# Patient Record
Sex: FEMALE | Race: WHITE | URBAN - METROPOLITAN AREA
[De-identification: names, ages, dates, MRNs, and addresses within clinical notes are randomized per-mention and may not be internally consistent; named-entity substitution may affect disease eponyms.]

---

## 2018-12-05 ENCOUNTER — HOSPITAL ENCOUNTER (OUTPATIENT)
Dept: ULTRASOUND IMAGING | Facility: CLINIC | Age: 78
Discharge: HOME | End: 2018-12-05
Attending: FAMILY MEDICINE | Admitting: FAMILY MEDICINE
Payer: MEDICARE

## 2018-12-05 DIAGNOSIS — M79.89 PAIN AND SWELLING OF LOWER EXTREMITY, UNSPECIFIED LATERALITY: ICD-10-CM

## 2018-12-05 DIAGNOSIS — M79.606 PAIN AND SWELLING OF LOWER EXTREMITY, UNSPECIFIED LATERALITY: ICD-10-CM

## 2018-12-05 DIAGNOSIS — R60.0 LEG EDEMA: ICD-10-CM

## 2018-12-05 PROCEDURE — 93970 EXTREMITY STUDY: CPT

## 2024-10-01 ENCOUNTER — TRANSCRIBE ORDERS (OUTPATIENT)
Dept: OTHER | Age: 84
End: 2024-10-01

## 2024-10-01 DIAGNOSIS — I27.21 PULMONARY ARTERIAL HYPERTENSION (H): Primary | ICD-10-CM

## 2024-10-02 ENCOUNTER — TELEPHONE (OUTPATIENT)
Dept: CARDIOLOGY | Facility: CLINIC | Age: 84
End: 2024-10-02
Payer: COMMERCIAL

## 2024-10-02 DIAGNOSIS — E61.1 IRON DEFICIENCY: ICD-10-CM

## 2024-10-02 DIAGNOSIS — R06.02 SOB (SHORTNESS OF BREATH): ICD-10-CM

## 2024-10-02 DIAGNOSIS — Z11.4 ENCOUNTER FOR SCREENING FOR HIV: ICD-10-CM

## 2024-10-02 DIAGNOSIS — Z11.59 NEED FOR HEPATITIS B SCREENING TEST: ICD-10-CM

## 2024-10-02 DIAGNOSIS — I27.20 PULMONARY HYPERTENSION (H): Primary | ICD-10-CM

## 2024-10-02 DIAGNOSIS — E78.5 DYSLIPIDEMIA: ICD-10-CM

## 2024-10-02 NOTE — TELEPHONE ENCOUNTER
[facility] Call Center    Phone Message    May a detailed message be left on voicemail: yes     Reason for Call: Appointment Intake    Referring Provider Name: Dr Dez Valdes     Diagnosis and/or Symptoms:     Pulmonary arterial hypertension (H) [I27.21]     Please call pt to schedule.      Action Taken: Message routed to:  [facility] ([facility] cardio    Travel Screening: Not Applicable     Date of Service:

## 2024-10-04 ENCOUNTER — PRE VISIT (OUTPATIENT)
Dept: CARDIOLOGY | Facility: CLINIC | Age: 84
End: 2024-10-04
Payer: COMMERCIAL

## 2024-10-04 DIAGNOSIS — Z11.59 ENCOUNTER FOR SCREENING FOR OTHER VIRAL DISEASES: ICD-10-CM

## 2024-10-04 DIAGNOSIS — D64.9 ANEMIA, UNSPECIFIED: ICD-10-CM

## 2024-10-04 DIAGNOSIS — I27.20 PULMONARY HYPERTENSION (H): Primary | ICD-10-CM

## 2024-10-04 DIAGNOSIS — R06.02 SOB (SHORTNESS OF BREATH): ICD-10-CM

## 2024-10-04 NOTE — TELEPHONE ENCOUNTER
RECORDS RECEIVED FROM:    DATE RECEIVED:    GENERAL RECORDS STATUS DETAILS   OFFICE NOTE from cardiologists Care Everywhere 6-20-24 Lucio   EKG (STRIPS & REPORTS) In process 9-27-24   ECHOS (IMAGES AND REPORTS) Received 8-21-24   PULMONARY HYPERTENSION      6 MINUTE WALK TEST Internal 10-4-24   PULMONARY FUNCTION TESTS In process 9-13-24   RIGHT HEART CATH (IMAGES) Received 9-27-24   SLEEP STUDY / OVERNIGHT OXIMETRY N/A    XR CHEST   (IMAGES AND REPORTS) Received 9-19-24   CHEST CT  (IMAGES AND REPORTS) N/A    V/Q SCAN (IMAGES) N/A    LIVER US  (IMAGES AND REPORTS) N/A    ANGIOGRAMS (IMAGES) Received CT Angio Chest 11-26-23   STRESS TEST   (IMAGES AND REPORTS) Received 4-12-24     [facility] Records/ Imaging Requested:   Action Taken Right Heart Cath Report  ECG Strips  PFT Full Report 9-27-24  All Of 2024 9-13-24     XR Chest Images  CT Angio Chest Images  NM Lexiscan Stress Test Images 9-19-24, 6-3-24  11-26-23  4-12-24     Right Heart Cath Images 9-27-24     ECHO Images 8-21-24, 6-5-24, 3-11-24

## 2024-12-12 ENCOUNTER — PRE VISIT (OUTPATIENT)
Dept: CARDIOLOGY | Facility: CLINIC | Age: 84
End: 2024-12-12
Payer: COMMERCIAL

## 2024-12-12 NOTE — TELEPHONE ENCOUNTER
Patient Name: Christina Novoa Age: 84 year old, female, 1940 MRN: [id number]   Referring Provider:  Dr Dez Valdes  [facility] Appt:         PH Medications:        Patient History: Pt has a history of chronic respiratory failure on intermittent home oxygen, chronic HFpEF, pulmonary hypertension, HTN, and peripheral neuropathy , emphysema    Multiple hospitalizations over the past year primarily for PEARSON, Hypoxia due to acute on chronic HFpEF     CARD: Dez Valdes-  6/20/24  PULM: Valencia Tellez-  9/13/24      Recent Testing:       Date Test Epic Media/Scan Care Everywhere     [facility]                 2. Resting:     3. Pressures: RA 10 mm Hg, PA 80/23 (44) mm Hg, PCWP 9 mm Hg.     4. Output (Candida, on room air): 2.9 L/min, 2.2 L/min/m2.     5. Resistance (Candida, on room air): SVR 2017 dsc-5,  dsc-5 (12.1 SNOWDEN).     6. Output (TD): 3.0 L/min, 2.3 L/min/m2.     7. Resistance (TD): SVR 1947,  dsc-5.     8. Post-drug therapy:     9. 60ppm nitric oxide, delivered via facemask with 10L/min O2.     10. Pressures: RA 5 mm Hg, PA 66/18 (37) mm Hg, WP 12 mm Hg.     11. Output (TD): 3.33 L/min, 2.5 L/min/m2.     12. Resistance (TD): SVR 2042 dsc-5,  dsc-5 (8 SNOWDEN).     13. Overall, with notable / severe pulmonary hypertension. Largely pre-capillary contribution to pulmonary hypertension, somewhat out of proportion to lung disease alone. There is a partial response to vasodilator challenge, with the mean PA   pressure decreasing by 7-8 mm Hg with a corresponding drop in PVR, and increase in cardiac output.  []  []   []      Angio/Stress      1. Normal hemodynamic response to pharmacological stress test with no ectopy noted.     2. A regadenoson infusion pharmacologic stress test was performed.     3. Functional capacity was not assessed.     4. The patient experienced shortness of breath during the stress test.     5. Negative stress ECG for ST segment depression.     6. Stress and rest perfusion  images are normal.  No evidence of infarct or ischemia seen.  LV is normal in size and LVEF is normal. EF >65%.    7. No prior study for comparison.     8. Myocardial perfusion imaging is normal.     Prior Study Comparison     No prior study for comparison.  []  []   []      Echo                1. Limited Echo w/ Doppler [[id number]].     2. Sinus tachycardia during study.     3. Normal LV size. Visually estimated LVEF >70%. No regional wall motion abnormalities. (Normal function). Grade 1 diastolic dysfunction.     4. Moderate RV enlargement with at least moderately reduced systolic function. Estimated PASP 67 mmHg + RA Pressure.     5. Mild aortic valve regurgitation.     6. There is mild tricuspid valve regurgitation.     7. The estimated pulmonary artery systolic pressure is 70 mmHg, moderate to severe pulmonary hypertension.     8. Main pulmonary artery dimension is at the dilated.     9. Mild-moderate pulmonic regurgitation.     10. The IVC measures <2.1 cm with >50% collapse upon inspiration consistent with normal right atrial pressure, 3 mmHg.     11. Compared to the prior study from 6/2024, the current study reveals higher pulmonary pressures, less AI and TR                        []  []   []      6MWT         02 sats on RA at rest 89 with HR 80; 02 sats on RA with activity 85 and HR 95. Pt walked for 1.5 minutes and 182* ft. Placed pt on 3 lpm continuous flow 02.   02 sats on 3 lpm 02 at rest 95 with HR 82; 02 sats on 3 lpm 02 with activity 91 and HR 84. Pt walked for 5 minutes and 546* ft.  []   []   []      PFT                 9/13/24   FVC 1.69 L, 87%  FEV1 1.24 L, 84%  FEV1/FVC 74%  TLC 2.47 L, 72%  RV 0.78 L, 50%  RV/TLC 32%  DLCO 16%  []   []   []      Sleep Study   []  []   []      Chest CT      IMPRESSION:   1. No evidence of pulmonary embolism. The main pulmonary artery is enlarged measuring 3.6 cm in diameter, this can be seen with pulmonary hypertension.   2.  Cardiomegaly, and reflux of  contrast into the IVC and hepatic veins, suggesting right heart dysfunction.   3.  Upper and basilar predominant emphysematous changes    []  []   []      V/Q Scan []   []   []      Abd/Liver US []   []   []      Misc:  []   []   []         []   []   []

## 2024-12-17 ENCOUNTER — LAB (OUTPATIENT)
Dept: CARDIOLOGY | Facility: CLINIC | Age: 84
End: 2024-12-17
Payer: COMMERCIAL

## 2024-12-17 ENCOUNTER — OFFICE VISIT (OUTPATIENT)
Dept: CARDIOLOGY | Facility: CLINIC | Age: 84
End: 2024-12-17
Payer: COMMERCIAL

## 2024-12-17 VITALS
BODY MASS INDEX: 20.6 KG/M2 | SYSTOLIC BLOOD PRESSURE: 125 MMHG | WEIGHT: 102 LBS | DIASTOLIC BLOOD PRESSURE: 73 MMHG | HEART RATE: 106 BPM

## 2024-12-17 DIAGNOSIS — D64.9 ANEMIA, UNSPECIFIED: ICD-10-CM

## 2024-12-17 DIAGNOSIS — R06.02 SOB (SHORTNESS OF BREATH): ICD-10-CM

## 2024-12-17 DIAGNOSIS — Z11.59 ENCOUNTER FOR SCREENING FOR OTHER VIRAL DISEASES: ICD-10-CM

## 2024-12-17 DIAGNOSIS — I27.20 PULMONARY HYPERTENSION (H): ICD-10-CM

## 2024-12-17 DIAGNOSIS — I27.21 PULMONARY ARTERIAL HYPERTENSION (H): ICD-10-CM

## 2024-12-17 LAB
ALBUMIN SERPL BCG-MCNC: 4.5 G/DL (ref 3.5–5.2)
ALP SERPL-CCNC: 99 U/L (ref 40–150)
ALT SERPL W P-5'-P-CCNC: 7 U/L (ref 0–50)
ANION GAP SERPL CALCULATED.3IONS-SCNC: 16 MMOL/L (ref 7–15)
AST SERPL W P-5'-P-CCNC: 22 U/L (ref 0–45)
BILIRUB DIRECT SERPL-MCNC: 0.25 MG/DL (ref 0–0.3)
BILIRUB SERPL-MCNC: 0.7 MG/DL
BUN SERPL-MCNC: 15.2 MG/DL (ref 8–23)
CALCIUM SERPL-MCNC: 9.5 MG/DL (ref 8.8–10.4)
CHLORIDE SERPL-SCNC: 100 MMOL/L (ref 98–107)
CREAT SERPL-MCNC: 1.19 MG/DL (ref 0.51–0.95)
CRP SERPL-MCNC: 8.09 MG/L
EGFRCR SERPLBLD CKD-EPI 2021: 45 ML/MIN/1.73M2
ERYTHROCYTE [DISTWIDTH] IN BLOOD BY AUTOMATED COUNT: 14.6 % (ref 10–15)
GLUCOSE SERPL-MCNC: 120 MG/DL (ref 70–99)
HCO3 SERPL-SCNC: 26 MMOL/L (ref 22–29)
HCT VFR BLD AUTO: 48.7 % (ref 35–47)
HGB BLD-MCNC: 15.9 G/DL (ref 11.7–15.7)
INR PPP: 1.17 (ref 0.85–1.15)
IRON BINDING CAPACITY (ROCHE): 295 UG/DL (ref 240–430)
IRON SATN MFR SERPL: 21 % (ref 15–46)
IRON SERPL-MCNC: 63 UG/DL (ref 37–145)
MCH RBC QN AUTO: 32.4 PG (ref 26.5–33)
MCHC RBC AUTO-ENTMCNC: 32.6 G/DL (ref 31.5–36.5)
MCV RBC AUTO: 99 FL (ref 78–100)
NT-PROBNP SERPL-MCNC: 8368 PG/ML (ref 0–1800)
PLATELET # BLD AUTO: 228 10E3/UL (ref 150–450)
POTASSIUM SERPL-SCNC: 4.1 MMOL/L (ref 3.4–5.3)
PROT SERPL-MCNC: 8.7 G/DL (ref 6.4–8.3)
RBC # BLD AUTO: 4.9 10E6/UL (ref 3.8–5.2)
SODIUM SERPL-SCNC: 142 MMOL/L (ref 135–145)
TSH SERPL DL<=0.005 MIU/L-ACNC: 1.75 UIU/ML (ref 0.3–4.2)
WBC # BLD AUTO: 8 10E3/UL (ref 4–11)

## 2024-12-17 PROCEDURE — 85613 RUSSELL VIPER VENOM DILUTED: CPT

## 2024-12-17 PROCEDURE — 86803 HEPATITIS C AB TEST: CPT

## 2024-12-17 PROCEDURE — 85390 FIBRINOLYSINS SCREEN I&R: CPT | Performed by: PATHOLOGY

## 2024-12-17 PROCEDURE — 99417 PROLNG OP E/M EACH 15 MIN: CPT | Performed by: INTERNAL MEDICINE

## 2024-12-17 PROCEDURE — 85027 COMPLETE CBC AUTOMATED: CPT

## 2024-12-17 PROCEDURE — 99205 OFFICE O/P NEW HI 60 MIN: CPT | Performed by: INTERNAL MEDICINE

## 2024-12-17 PROCEDURE — 80053 COMPREHEN METABOLIC PANEL: CPT

## 2024-12-17 PROCEDURE — 80061 LIPID PANEL: CPT

## 2024-12-17 PROCEDURE — 86140 C-REACTIVE PROTEIN: CPT

## 2024-12-17 PROCEDURE — 86431 RHEUMATOID FACTOR QUANT: CPT

## 2024-12-17 PROCEDURE — 85610 PROTHROMBIN TIME: CPT

## 2024-12-17 PROCEDURE — 84443 ASSAY THYROID STIM HORMONE: CPT

## 2024-12-17 PROCEDURE — 99000 SPECIMEN HANDLING OFFICE-LAB: CPT

## 2024-12-17 PROCEDURE — 86039 ANTINUCLEAR ANTIBODIES (ANA): CPT

## 2024-12-17 PROCEDURE — 87340 HEPATITIS B SURFACE AG IA: CPT

## 2024-12-17 PROCEDURE — 83880 ASSAY OF NATRIURETIC PEPTIDE: CPT

## 2024-12-17 PROCEDURE — 82248 BILIRUBIN DIRECT: CPT

## 2024-12-17 PROCEDURE — 84238 ASSAY NONENDOCRINE RECEPTOR: CPT | Mod: 90

## 2024-12-17 PROCEDURE — 86038 ANTINUCLEAR ANTIBODIES: CPT

## 2024-12-17 PROCEDURE — 86706 HEP B SURFACE ANTIBODY: CPT

## 2024-12-17 PROCEDURE — 86704 HEP B CORE ANTIBODY TOTAL: CPT

## 2024-12-17 PROCEDURE — 83550 IRON BINDING TEST: CPT

## 2024-12-17 PROCEDURE — 87389 HIV-1 AG W/HIV-1&-2 AB AG IA: CPT

## 2024-12-17 PROCEDURE — 83529 ASAY OF INTERLEUKIN-6 (IL-6): CPT

## 2024-12-17 PROCEDURE — 85730 THROMBOPLASTIN TIME PARTIAL: CPT

## 2024-12-17 PROCEDURE — 83540 ASSAY OF IRON: CPT

## 2024-12-17 RX ORDER — GABAPENTIN 100 MG/1
100 CAPSULE ORAL AT BEDTIME
COMMUNITY

## 2024-12-17 RX ORDER — POTASSIUM CHLORIDE 1500 MG/1
1 TABLET, EXTENDED RELEASE ORAL
COMMUNITY
Start: 2024-11-28

## 2024-12-17 RX ORDER — AMLODIPINE BESYLATE 2.5 MG/1
2.5 TABLET ORAL DAILY
COMMUNITY
Start: 2024-10-30 | End: 2025-10-30

## 2024-12-17 RX ORDER — SILDENAFIL CITRATE 20 MG/1
20 TABLET ORAL 3 TIMES DAILY
COMMUNITY
Start: 2024-12-17 | End: 2024-12-18

## 2024-12-17 RX ORDER — ASCORBIC ACID 250 MG
TABLET ORAL
COMMUNITY

## 2024-12-17 RX ORDER — TRAZODONE HYDROCHLORIDE 50 MG/1
TABLET, FILM COATED ORAL
COMMUNITY
Start: 2024-07-25

## 2024-12-17 RX ORDER — CALCIUM CARBONATE 500 MG/1
500 TABLET, CHEWABLE ORAL
COMMUNITY

## 2024-12-17 RX ORDER — FUROSEMIDE 20 MG/1
1 TABLET ORAL
COMMUNITY
Start: 2024-04-16 | End: 2024-12-17

## 2024-12-17 RX ORDER — MAGNESIUM OXIDE 400 MG/1
1 TABLET ORAL DAILY
COMMUNITY

## 2024-12-17 RX ORDER — TORSEMIDE 20 MG/1
40 TABLET ORAL DAILY
COMMUNITY

## 2024-12-17 NOTE — LETTER
2024    Suri Singh MD  [facility] [address]  [facility] [address]    RE: Christina Novoa       Dear Colleague,     I had the pleasure of seeing Christina Novoa in the [facility].  Service Date: 2024    RE:  Christina Novoa   MRN:  [id number]  :  1940      Dear Dr. Valdes:    We had the pleasure of seeing Christina Novoa at the [facility]. Although you are familiar with this patient's history, please allow me to summarize it for the purpose of our records.     Ms. Novoa is a very delightful 84-year-old female who was apparently healthy up until last  when she started noticing exertional shortness of breath and lower extremity edema.  She went to the emergency room.  CT pulmonary angiogram showed no pulmonary embolism but dilated main pulmonary artery and bilateral upper lobe emphysema.  She tested positive for COVID.  Subsequently she continued to have significant exertional shortness of breath lower extremity edema and fatigue.    In light of this, she was seen by both cardiology and pulmonary at [facility].  Her echocardiogram revealed moderately dilated right ventricle with moderately reduced right ventricular systolic function.  Her estimated right ventricle systolic pressure was elevated at 65 mmHg.  This was new when compared to her prior echocardiogram from 2018.  Subsequently she had a myocardial perfusion imaging which showed no evidence of reversible ischemia.  She had an invasive right heart catheterization that confirmed severe precapillary pulm hypertension.    Her RA pressure was 10 mmHg, RV was 79 over 15 mmHg, PA was 80/23 with a mean of 44 mmHg, pulmonary capillary wedge pressure of 9, aortic saturation 96%, PA saturation of 58.8, thermodilution cardiac output of 3.03 L/min, index of 2.3 L/min/m , and PVR of 11.5 Wood units.  She had acute vasodilator testing with  inhaled nitric oxide.  With inhaled nitric oxide her PA pressure dropped to 66/18 with a mean of 37, PA saturation was 79%, aortic saturation was 100%, thermodilution cardiac output of 3.3 with an index of 2.5, pulmonary capital wedge pressure of 12 mmHg and 7.7 Wood units.    She had a VQ scan that was low probability for pulmonary embolism.  Her pulmonary function test showed an FVC of 87% predicted, FEV1 of 84% predicted, FEV1 by FVC of 74% predicted, TLC of 72% predicted, and DLCO of 16% predicted.    She did not respond to furosemide.  She had a significant improvement in her lower extremity edema when she was switched to torsemide.  She is now referred to us for further management of her pulm hypertension.    At present, she is feeling much better than this past summer.  She has shortness of breath only with exertion.  She has no symptoms at rest or with her usual activities.  She can walk up to a block without needing to stop.  She gets shortness of breath if she walks more than a block or when she fixes her bed.  She can do her laundry and grocery shopping by herself.  I would currently characterize her as NYHA functional class IIb.  Her lower extremity swelling has improved on low-dose torsemide.  She denies having any exertional chest pain or chest pressure.  She has no exertional presyncope or syncope.  No recent hospitalization or ER visits.  No palpitations.    She was prescribed 3 L of supplemental oxygen.  She uses it only as needed.  She is not using it on a regular basis.    PAST MEDICAL HISTORY:  1.  Systemic hypertension  2.  Hyperlipidemia  3.  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  Recent diagnosis of pulmonary hypertension and right heart failure    PAST SURGICAL HISTORY:  Cataract surgery    CURRENT MEDICATIONS:  Current Outpatient Medications   Medication Sig Dispense Refill     amLODIPine (NORVASC) 2.5 MG tablet Take 2.5 mg by mouth daily.       ascorbic acid 62.5 mg  TABS quarter-tab        calcium carbonate (TUMS) 500 MG chewable tablet Take 500 mg by mouth.       gabapentin (NEURONTIN) 100 MG capsule Take 100 mg by mouth at bedtime.       magnesium oxide (MAG-OX) 400 MG tablet Take 1 tablet by mouth daily.       multivitamin, therapeutic (THERA-VIT) TABS Take 1 tablet by mouth daily       OMEPRAZOLE PO Take 20 mg by mouth daily (before lunch).       potassium chloride ER (K-TAB) 20 MEQ CR tablet Take 1 tablet by mouth daily at 2 pm.       sildenafil (REVATIO) 20 MG tablet Take 1 tablet (20 mg) by mouth 3 times daily.       study - aspirin, IDS# [id number], 81 mg tablet Take 1 tablet by mouth daily.       torsemide (DEMADEX) 20 MG tablet Take 20 mg by mouth daily.       traZODone (DESYREL) 50 MG tablet        TRAZODONE HCL PO Take 100 mg by mouth At Bedtime.       No current facility-administered medications for this visit.       ROS:   10 point ROS negative except as discussed in above HPI.    SOCIAL HISTORY:  She is a .  Her   approximately 4 years ago.  She lives by herself independently in [facility].  She has 4 grown kids.  She worked as a  in [employer] for many years.  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  She does not smoke.  Her  smoked very briefly before their first child was born.  She does not drink alcohol.    FAMILY HISTORY:  No family history of pulmonary hypertension, chronic lung disease, or heart disease.    EXAM:  /73 (BP Location: Left arm, Patient Position: Sitting, Cuff Size: Adult Small)   Pulse 106   Wt 46.3 kg (102 lb)   BMI 20.60 kg/m    Awake, alert, and oriented x 3.   Comfortable. No apparent distress.  No pallor, cyanosis, or clubbing  Carotids 2+ bilateral and pulse was regular in rhythm.  Jugular venous distention visible at 6 cm above manubrium sternal angle.  Heart: regular, normal S1/loud P2, no murmur, gallop, rub  Lungs: NVBS and clear to auscultation  bilaterally, basal crepitations bilaterally   abdomen: soft, non-tender, bowel sounds present, no hepatosplenomegaly  Extremities: 1+ edema bilaterally.  Extremities were warm and well-perfused.  Neurological: No gross focal neurological deficit    Labs:  Recent Results (from the past week)   Iron and iron binding capacity    Collection Time: 12/17/24  1:07 PM   Result Value Ref Range    Iron 63 37 - 145 ug/dL    Iron Binding Capacity 295 240 - 430 ug/dL    Iron Sat Index 21 15 - 46 %   CRP inflammation    Collection Time: 12/17/24  1:07 PM   Result Value Ref Range    CRP Inflammation 8.09 (H) <5.00 mg/L   TSH    Collection Time: 12/17/24  1:07 PM   Result Value Ref Range    TSH 1.75 0.30 - 4.20 uIU/mL   N terminal pro BNP outpatient    Collection Time: 12/17/24  1:07 PM   Result Value Ref Range    N Terminal Pro BNP Outpatient 8,368 (H) 0 - 1,800 pg/mL   INR    Collection Time: 12/17/24  1:07 PM   Result Value Ref Range    INR 1.17 (H) 0.85 - 1.15   CBC with platelets    Collection Time: 12/17/24  1:07 PM   Result Value Ref Range    WBC Count 8.0 4.0 - 11.0 10e3/uL    RBC Count 4.90 3.80 - 5.20 10e6/uL    Hemoglobin 15.9 (H) 11.7 - 15.7 g/dL    Hematocrit 48.7 (H) 35.0 - 47.0 %    MCV 99 78 - 100 fL    MCH 32.4 26.5 - 33.0 pg    MCHC 32.6 31.5 - 36.5 g/dL    RDW 14.6 10.0 - 15.0 %    Platelet Count 228 150 - 450 10e3/uL   Hepatic panel    Collection Time: 12/17/24  1:07 PM   Result Value Ref Range    Protein Total 8.7 (H) 6.4 - 8.3 g/dL    Albumin 4.5 3.5 - 5.2 g/dL    Bilirubin Total 0.7 <=1.2 mg/dL    Alkaline Phosphatase 99 40 - 150 U/L    AST 22 0 - 45 U/L    ALT 7 0 - 50 U/L    Bilirubin Direct 0.25 0.00 - 0.30 mg/dL   Basic metabolic panel    Collection Time: 12/17/24  1:07 PM   Result Value Ref Range    Sodium 142 135 - 145 mmol/L    Potassium 4.1 3.4 - 5.3 mmol/L    Chloride 100 98 - 107 mmol/L    Carbon Dioxide (CO2) 26 22 - 29 mmol/L    Anion Gap 16 (H) 7 - 15 mmol/L    Urea Nitrogen 15.2 8.0 - 23.0 mg/dL     Creatinine 1.19 (H) 0.51 - 0.95 mg/dL    GFR Estimate 45 (L) >60 mL/min/1.73m2    Calcium 9.5 8.8 - 10.4 mg/dL    Glucose 120 (H) 70 - 99 mg/dL     EKG (September 2024):  Sinus rhythm, right axis deviation, T wave inversions in anterolateral and inferior leads    Echocardiogram (September 2024):  1. Sinus rhythm during study.     2. Normal LV size with normal wall thickness. Visually estimated LVEF 65-70%. LV appears underfilled. No regional wall motion abnormalities. (Normal function). Normal diastolic function.     3. RV is enlarged in size with moderately decreased systolic function.     4. D shaped septum suggestive of RV pressure/volume overload.     5. Normal LA size.     6. Severe RA enlargement.     7. Moderate aortic valve regurgitation.     8. There is moderate-severe tricuspid valve regurgitation.     9. The estimated pulmonary artery systolic pressure is 56 mmHg.     10. Trileaflet aortic valve.     11. Compared to the prior study from 2024, the current study reveals worsened TR based on colorflow Doppler, RV enlargement, underfilled LV. Consider cardiology consult and right heart catherization.     RHC (September 2024):  Her RA pressure was 10 mmHg, RV was 79 over 15 mmHg, PA was 80/23 with a mean of 44 mmHg, pulmonary capillary wedge pressure of 9, aortic saturation 96%, PA saturation of 58.8, thermodilution cardiac output of 3.03 L/min, index of 2.3 L/min/m , and PVR of 11.5 Wood units.  She had acute vasodilator testing with inhaled nitric oxide.  With inhaled nitric oxide her PA pressure dropped to 66/18 with a mean of 37, PA saturation was 79%, aortic saturation was 100%, thermodilution cardiac output of 3.3 with an index of 2.5, pulmonary capital wedge pressure of 12 mmHg and 7.7 Wood units.      PFT (September 2024)  Her pulmonary function test showed an FVC of 87% predicted, FEV1 of 84% predicted, FEV1 by FVC of 74% predicted, TLC of 72% predicted, and DLCO of 16% predicted.    V/Q scan  (October 2024)  Low probability for chronic thromboembolic disease    CT Chest (December 2023)  1.  No evidence of pulmonary embolism. The main pulmonary artery is enlarged measuring 3.6 cm in diameter, this can be seen with pulmonary hypertension.   2.  Cardiomegaly, and reflux of contrast into the IVC and hepatic veins, suggesting right heart dysfunction.   3.  Upper and basilar predominant emphysematous changes.       Assessment and Plan:     In summary, Christina Novoa is a extremely delightful 84-year-old female with a recent diagnosis of cystic lung disease, severe precapillary pulmonary hypertension and right heart failure who is referred to us for further evaluation management.    1.  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  Pulmonary arterial hypertension out of proportion to chronic lung disease  3.  Right heart failure with moderate RV dilatation and dysfunction  4.  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  Her pulmonary capillary wedge pressure was within normal limits.  She has no echocardiographic evidence of left heart disease.  Her VQ scan was low probability.  Her CT chest shows bilateral cystic lung disease.  Her PFT shows significant reduction in DLCO.  Her lung volumes were not that abnormal.    She is also in decompensated right heart failure.  Clinically she has mild elevation in JVD with lower extremity edema.  Her NT proBNP is significantly elevated.    To be thorough, I have recommended her to complete the serological workup for pulmonary hypertension to rule out other associated causes.  We will also do a 6-minute walk test to assess her exercise capacity and her oxygen requirements.    I discussed the diagnosis, the treatment options, and overall outcome.  The safety and efficacy of pulmonary  vasodilator therapy in patients with pulmonary arterial hypertension out of proportion to chronic lung disease is unclear.  Observational and retrospective data suggest that PDE 5 inhibitors improves right ventricular function, hemodynamics and may be survival.  There is a theoretical risk of worsening VQ mismatch and hypoxemia.      I had a very clear discussion with the lack of clear efficacy and safety data in this particular situation with her and her son.  They understand the risk and benefits and would like to try sildenafil therapy which I think is reasonable.  However,  first, I have recommended her to have a 6-minute walk test with O2 titration to determine how much oxygen she needs.  Will do this prior to starting sildenafil therapy.  Will repeat her 6-minute walk test in 4 to 6 weeks after she starts sildenafil to make sure that she is not having worsening VQ mismatch and hypoxemia.  I suspect that she likely will need supplemental oxygen with exertion which is much more important than sildenafil.  She is not a candidate for lung transplantation given her age.    In the interim, I have recommended her to increase her torsemide to 40 mg daily.  We also discussed the importance of low-salt diet. If her blood pressure is low on sildenafil we will consider stopping her amlodipine.  She will return to see us in 4 to 6 weeks after starting sildenafil therapy with a repeat 6-minute walk test.  She will call us in the interim should any further worsening symptoms    It was a pleasure seeing Eusebia Bright at the [facility]. Please contact us with any questions or concerns that you may have. We thank you for involving us in this patients care.    Total time today was 80 minutes reviewing notes, imaging, labs, patient visit, orders and documentation     Sincerely,      Christofer Beaulieu MD  Professor of Medicine  [facility]  Heart Failure,  Transplant, and Mechanical Circulatory Support Cardiology   Cardiovascular Division  [facility]   [phone]        Thank you for allowing me to participate in the care of your patient.      Sincerely,     Christofer Beaulieu MD     [facility]  cc:   Christofer Beaulieu MD  [address] [place] [id number]

## 2024-12-17 NOTE — LETTER
2024      RE: Christina Novoa  [address] [facility] [address]       Service Date: 2024    RE:  Christina Novoa   MRN:  [id number]  :  1940      Dear Dr. Valdes:    We had the pleasure of seeing Christina Novoa at the [facility]. Although you are familiar with this patient's history, please allow me to summarize it for the purpose of our records.     Ms. Novoa is a very delightful 84-year-old female who was apparently healthy up until last  when she started noticing exertional shortness of breath and lower extremity edema.  She went to the emergency room.  CT pulmonary angiogram showed no pulmonary embolism but dilated main pulmonary artery and bilateral upper lobe emphysema.  She tested positive for COVID.  Subsequently she continued to have significant exertional shortness of breath lower extremity edema and fatigue.    In light of this, she was seen by both cardiology and pulmonary at [facility].  Her echocardiogram revealed moderately dilated right ventricle with moderately reduced right ventricular systolic function.  Her estimated right ventricle systolic pressure was elevated at 65 mmHg.  This was new when compared to her prior echocardiogram from 2018.  Subsequently she had a myocardial perfusion imaging which showed no evidence of reversible ischemia.  She had an invasive right heart catheterization that confirmed severe precapillary pulm hypertension.    Her RA pressure was 10 mmHg, RV was 79 over 15 mmHg, PA was 80/23 with a mean of 44 mmHg, pulmonary capillary wedge pressure of 9, aortic saturation 96%, PA saturation of 58.8, thermodilution cardiac output of 3.03 L/min, index of 2.3 L/min/m , and PVR of 11.5 Wood units.  She had acute vasodilator testing with inhaled nitric oxide.  With inhaled nitric oxide her PA pressure dropped to 66/18 with a mean of 37, PA saturation was 79%, aortic saturation was 100%,  thermodilution cardiac output of 3.3 with an index of 2.5, pulmonary capital wedge pressure of 12 mmHg and 7.7 Wood units.    She had a VQ scan that was low probability for pulmonary embolism.  Her pulmonary function test showed an FVC of 87% predicted, FEV1 of 84% predicted, FEV1 by FVC of 74% predicted, TLC of 72% predicted, and DLCO of 16% predicted.    She did not respond to furosemide.  She had a significant improvement in her lower extremity edema when she was switched to torsemide.  She is now referred to us for further management of her pulm hypertension.    At present, she is feeling much better than this past summer.  She has shortness of breath only with exertion.  She has no symptoms at rest or with her usual activities.  She can walk up to a block without needing to stop.  She gets shortness of breath if she walks more than a block or when she fixes her bed.  She can do her laundry and grocery shopping by herself.  I would currently characterize her as NYHA functional class IIb.  Her lower extremity swelling has improved on low-dose torsemide.  She denies having any exertional chest pain or chest pressure.  She has no exertional presyncope or syncope.  No recent hospitalization or ER visits.  No palpitations.    She was prescribed 3 L of supplemental oxygen.  She uses it only as needed.  She is not using it on a regular basis.    PAST MEDICAL HISTORY:  1.  Systemic hypertension  2.  Hyperlipidemia  3.  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  Recent diagnosis of pulmonary hypertension and right heart failure    PAST SURGICAL HISTORY:  Cataract surgery    CURRENT MEDICATIONS:  Current Outpatient Medications   Medication Sig Dispense Refill     amLODIPine (NORVASC) 2.5 MG tablet Take 2.5 mg by mouth daily.       ascorbic acid 62.5 mg TABS quarter-tab        calcium carbonate (TUMS) 500 MG chewable tablet Take 500 mg by mouth.       gabapentin (NEURONTIN) 100 MG capsule Take 100 mg by  mouth at bedtime.       magnesium oxide (MAG-OX) 400 MG tablet Take 1 tablet by mouth daily.       multivitamin, therapeutic (THERA-VIT) TABS Take 1 tablet by mouth daily       OMEPRAZOLE PO Take 20 mg by mouth daily (before lunch).       potassium chloride ER (K-TAB) 20 MEQ CR tablet Take 1 tablet by mouth daily at 2 pm.       sildenafil (REVATIO) 20 MG tablet Take 1 tablet (20 mg) by mouth 3 times daily.       study - aspirin, IDS# [id number], 81 mg tablet Take 1 tablet by mouth daily.       torsemide (DEMADEX) 20 MG tablet Take 20 mg by mouth daily.       traZODone (DESYREL) 50 MG tablet        TRAZODONE HCL PO Take 100 mg by mouth At Bedtime.       No current facility-administered medications for this visit.       ROS:   10 point ROS negative except as discussed in above HPI.    SOCIAL HISTORY:  She is a .  Her   approximately 4 years ago.  She lives by herself independently in [facility].  She has 4 grown kids.  She worked as a  in [employer] for many years.  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  She does not smoke.  Her  smoked very briefly before their first child was born.  She does not drink alcohol.    FAMILY HISTORY:  No family history of pulmonary hypertension, chronic lung disease, or heart disease.    EXAM:  /73 (BP Location: Left arm, Patient Position: Sitting, Cuff Size: Adult Small)   Pulse 106   Wt 46.3 kg (102 lb)   BMI 20.60 kg/m    Awake, alert, and oriented x 3.   Comfortable. No apparent distress.  No pallor, cyanosis, or clubbing  Carotids 2+ bilateral and pulse was regular in rhythm.  Jugular venous distention visible at 6 cm above manubrium sternal angle.  Heart: regular, normal S1/loud P2, no murmur, gallop, rub  Lungs: NVBS and clear to auscultation bilaterally, basal crepitations bilaterally   abdomen: soft, non-tender, bowel sounds present, no hepatosplenomegaly  Extremities: 1+ edema bilaterally.   Extremities were warm and well-perfused.  Neurological: No gross focal neurological deficit    Labs:  Recent Results (from the past week)   Iron and iron binding capacity    Collection Time: 12/17/24  1:07 PM   Result Value Ref Range    Iron 63 37 - 145 ug/dL    Iron Binding Capacity 295 240 - 430 ug/dL    Iron Sat Index 21 15 - 46 %   CRP inflammation    Collection Time: 12/17/24  1:07 PM   Result Value Ref Range    CRP Inflammation 8.09 (H) <5.00 mg/L   TSH    Collection Time: 12/17/24  1:07 PM   Result Value Ref Range    TSH 1.75 0.30 - 4.20 uIU/mL   N terminal pro BNP outpatient    Collection Time: 12/17/24  1:07 PM   Result Value Ref Range    N Terminal Pro BNP Outpatient 8,368 (H) 0 - 1,800 pg/mL   INR    Collection Time: 12/17/24  1:07 PM   Result Value Ref Range    INR 1.17 (H) 0.85 - 1.15   CBC with platelets    Collection Time: 12/17/24  1:07 PM   Result Value Ref Range    WBC Count 8.0 4.0 - 11.0 10e3/uL    RBC Count 4.90 3.80 - 5.20 10e6/uL    Hemoglobin 15.9 (H) 11.7 - 15.7 g/dL    Hematocrit 48.7 (H) 35.0 - 47.0 %    MCV 99 78 - 100 fL    MCH 32.4 26.5 - 33.0 pg    MCHC 32.6 31.5 - 36.5 g/dL    RDW 14.6 10.0 - 15.0 %    Platelet Count 228 150 - 450 10e3/uL   Hepatic panel    Collection Time: 12/17/24  1:07 PM   Result Value Ref Range    Protein Total 8.7 (H) 6.4 - 8.3 g/dL    Albumin 4.5 3.5 - 5.2 g/dL    Bilirubin Total 0.7 <=1.2 mg/dL    Alkaline Phosphatase 99 40 - 150 U/L    AST 22 0 - 45 U/L    ALT 7 0 - 50 U/L    Bilirubin Direct 0.25 0.00 - 0.30 mg/dL   Basic metabolic panel    Collection Time: 12/17/24  1:07 PM   Result Value Ref Range    Sodium 142 135 - 145 mmol/L    Potassium 4.1 3.4 - 5.3 mmol/L    Chloride 100 98 - 107 mmol/L    Carbon Dioxide (CO2) 26 22 - 29 mmol/L    Anion Gap 16 (H) 7 - 15 mmol/L    Urea Nitrogen 15.2 8.0 - 23.0 mg/dL    Creatinine 1.19 (H) 0.51 - 0.95 mg/dL    GFR Estimate 45 (L) >60 mL/min/1.73m2    Calcium 9.5 8.8 - 10.4 mg/dL    Glucose 120 (H) 70 - 99 mg/dL      EKG (September 2024):  Sinus rhythm, right axis deviation, T wave inversions in anterolateral and inferior leads    Echocardiogram (September 2024):  1. Sinus rhythm during study.     2. Normal LV size with normal wall thickness. Visually estimated LVEF 65-70%. LV appears underfilled. No regional wall motion abnormalities. (Normal function). Normal diastolic function.     3. RV is enlarged in size with moderately decreased systolic function.     4. D shaped septum suggestive of RV pressure/volume overload.     5. Normal LA size.     6. Severe RA enlargement.     7. Moderate aortic valve regurgitation.     8. There is moderate-severe tricuspid valve regurgitation.     9. The estimated pulmonary artery systolic pressure is 56 mmHg.     10. Trileaflet aortic valve.     11. Compared to the prior study from 2024, the current study reveals worsened TR based on colorflow Doppler, RV enlargement, underfilled LV. Consider cardiology consult and right heart catherization.     RHC (September 2024):  Her RA pressure was 10 mmHg, RV was 79 over 15 mmHg, PA was 80/23 with a mean of 44 mmHg, pulmonary capillary wedge pressure of 9, aortic saturation 96%, PA saturation of 58.8, thermodilution cardiac output of 3.03 L/min, index of 2.3 L/min/m , and PVR of 11.5 Wood units.  She had acute vasodilator testing with inhaled nitric oxide.  With inhaled nitric oxide her PA pressure dropped to 66/18 with a mean of 37, PA saturation was 79%, aortic saturation was 100%, thermodilution cardiac output of 3.3 with an index of 2.5, pulmonary capital wedge pressure of 12 mmHg and 7.7 Wood units.      PFT (September 2024)  Her pulmonary function test showed an FVC of 87% predicted, FEV1 of 84% predicted, FEV1 by FVC of 74% predicted, TLC of 72% predicted, and DLCO of 16% predicted.    V/Q scan (October 2024)  Low probability for chronic thromboembolic disease    CT Chest (December 2023)  1.  No evidence of pulmonary embolism. The main  pulmonary artery is enlarged measuring 3.6 cm in diameter, this can be seen with pulmonary hypertension.   2.  Cardiomegaly, and reflux of contrast into the IVC and hepatic veins, suggesting right heart dysfunction.   3.  Upper and basilar predominant emphysematous changes.       Assessment and Plan:     In summary, Christina Novoa is a extremely delightful 84-year-old female with a recent diagnosis of cystic lung disease, severe precapillary pulmonary hypertension and right heart failure who is referred to us for further evaluation management.    1.  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  Pulmonary arterial hypertension out of proportion to chronic lung disease  3.  Right heart failure with moderate RV dilatation and dysfunction  4.  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  Her pulmonary capillary wedge pressure was within normal limits.  She has no echocardiographic evidence of left heart disease.  Her VQ scan was low probability.  Her CT chest shows bilateral cystic lung disease.  Her PFT shows significant reduction in DLCO.  Her lung volumes were not that abnormal.    She is also in decompensated right heart failure.  Clinically she has mild elevation in JVD with lower extremity edema.  Her NT proBNP is significantly elevated.    To be thorough, I have recommended her to complete the serological workup for pulmonary hypertension to rule out other associated causes.  We will also do a 6-minute walk test to assess her exercise capacity and her oxygen requirements.    I discussed the diagnosis, the treatment options, and overall outcome.  The safety and efficacy of pulmonary vasodilator therapy in patients with pulmonary arterial hypertension out of proportion to chronic lung disease is unclear.  Observational and  retrospective data suggest that PDE 5 inhibitors improves right ventricular function, hemodynamics and may be survival.  There is a theoretical risk of worsening VQ mismatch and hypoxemia.      I had a very clear discussion with the lack of clear efficacy and safety data in this particular situation with her and her son.  They understand the risk and benefits and would like to try sildenafil therapy which I think is reasonable.  However,  first, I have recommended her to have a 6-minute walk test with O2 titration to determine how much oxygen she needs.  Will do this prior to starting sildenafil therapy.  Will repeat her 6-minute walk test in 4 to 6 weeks after she starts sildenafil to make sure that she is not having worsening VQ mismatch and hypoxemia.  I suspect that she likely will need supplemental oxygen with exertion which is much more important than sildenafil.  She is not a candidate for lung transplantation given her age.    In the interim, I have recommended her to increase her torsemide to 40 mg daily.  We also discussed the importance of low-salt diet. If her blood pressure is low on sildenafil we will consider stopping her amlodipine.  She will return to see us in 4 to 6 weeks after starting sildenafil therapy with a repeat 6-minute walk test.  She will call us in the interim should any further worsening symptoms    It was a pleasure seeing Christina Novoa at the [facility]. Please contact us with any questions or concerns that you may have. We thank you for involving us in this patients care.    Total time today was 80 minutes reviewing notes, imaging, labs, patient visit, orders and documentation     Sincerely,      Christofer Beaulieu MD  Professor of Medicine  [facility]  Heart Failure, Transplant, and Mechanical Circulatory Support Cardiology   Cardiovascular Division  [facility]    [phone]          Christofer Beaulieu MD

## 2024-12-17 NOTE — PATIENT INSTRUCTIONS
You were seen today in the Pulmonary Hypertension Clinic at the [facility].     Cardiology Provider you saw during your visit:    Dr. Beaulieu    Medication Changes:  We will start a prior authorization for Sildenafil- we will call you once this is approved    Follow-up:   Complete the walk test PRIOR to starting sildenafil- please call [phone] to schedule  START sildenafil after walk test  Follow-up in January 21st at 11:20 AM at [facility] with Dr. Beaulieu and complete another walk test prior to that appointment within 1 week.     Results:       Please call us immediately if you have any syncope (fainting or passing out), chest pain, edema (swelling or weight gain), or decline in your functional status (general decline in how you are feeling).    If you have emergent concerns after hours or on the weekend, please call our on-call Cardiologist at [phone], option 4. For emergencies call [phone].     Thank you for allowing us to be a part of your care here at the [facility]    Please visit the pulmonary hypertension website for more information and support. [email or web address]    If you have questions or concerns please contact us at:    Ward Moe RN (P: [phone])    Nurse Coordinator       Pulmonary Hypertension     [facility]         [place]   (Prior Auths & Patient Assistance )             ()  Clinic   Clinic   Pulmonary Hypertension   Pulmonary Hypertension  [facility]  [facility]  (P)[phone]    (P) [phone]  (F) [phone]

## 2024-12-17 NOTE — PROGRESS NOTES
Service Date: 2024    RE:  Christina Novoa   MRN:  [id number]  :  1940      Dear Dr. Valdes:    We had the pleasure of seeing Christina Novoa at the [facility]. Although you are familiar with this patient's history, please allow me to summarize it for the purpose of our records.     Ms. Novoa is a very delightful 84-year-old female who was apparently healthy up until last  when she started noticing exertional shortness of breath and lower extremity edema.  She went to the emergency room.  CT pulmonary angiogram showed no pulmonary embolism but dilated main pulmonary artery and bilateral upper lobe emphysema.  She tested positive for COVID.  Subsequently she continued to have significant exertional shortness of breath lower extremity edema and fatigue.    In light of this, she was seen by both cardiology and pulmonary at [facility].  Her echocardiogram revealed moderately dilated right ventricle with moderately reduced right ventricular systolic function.  Her estimated right ventricle systolic pressure was elevated at 65 mmHg.  This was new when compared to her prior echocardiogram from 2018.  Subsequently she had a myocardial perfusion imaging which showed no evidence of reversible ischemia.  She had an invasive right heart catheterization that confirmed severe precapillary pulm hypertension.    Her RA pressure was 10 mmHg, RV was 79 over 15 mmHg, PA was 80/23 with a mean of 44 mmHg, pulmonary capillary wedge pressure of 9, aortic saturation 96%, PA saturation of 58.8, thermodilution cardiac output of 3.03 L/min, index of 2.3 L/min/m , and PVR of 11.5 Wood units.  She had acute vasodilator testing with inhaled nitric oxide.  With inhaled nitric oxide her PA pressure dropped to 66/18 with a mean of 37, PA saturation was 79%, aortic saturation was 100%, thermodilution cardiac output of 3.3 with an index of 2.5, pulmonary capital wedge  pressure of 12 mmHg and 7.7 Wood units.    She had a VQ scan that was low probability for pulmonary embolism.  Her pulmonary function test showed an FVC of 87% predicted, FEV1 of 84% predicted, FEV1 by FVC of 74% predicted, TLC of 72% predicted, and DLCO of 16% predicted.    She did not respond to furosemide.  She had a significant improvement in her lower extremity edema when she was switched to torsemide.  She is now referred to us for further management of her pulm hypertension.    At present, she is feeling much better than this past summer.  She has shortness of breath only with exertion.  She has no symptoms at rest or with her usual activities.  She can walk up to a block without needing to stop.  She gets shortness of breath if she walks more than a block or when she fixes her bed.  She can do her laundry and grocery shopping by herself.  I would currently characterize her as NYHA functional class IIb.  Her lower extremity swelling has improved on low-dose torsemide.  She denies having any exertional chest pain or chest pressure.  She has no exertional presyncope or syncope.  No recent hospitalization or ER visits.  No palpitations.    She was prescribed 3 L of supplemental oxygen.  She uses it only as needed.  She is not using it on a regular basis.    PAST MEDICAL HISTORY:  1.  Systemic hypertension  2.  Hyperlipidemia  3.  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  Recent diagnosis of pulmonary hypertension and right heart failure    PAST SURGICAL HISTORY:  Cataract surgery    CURRENT MEDICATIONS:  Current Outpatient Medications   Medication Sig Dispense Refill    amLODIPine (NORVASC) 2.5 MG tablet Take 2.5 mg by mouth daily.      ascorbic acid 62.5 mg TABS quarter-tab       calcium carbonate (TUMS) 500 MG chewable tablet Take 500 mg by mouth.      gabapentin (NEURONTIN) 100 MG capsule Take 100 mg by mouth at bedtime.      magnesium oxide (MAG-OX) 400 MG tablet Take 1 tablet by mouth  daily.      multivitamin, therapeutic (THERA-VIT) TABS Take 1 tablet by mouth daily      OMEPRAZOLE PO Take 20 mg by mouth daily (before lunch).      potassium chloride ER (K-TAB) 20 MEQ CR tablet Take 1 tablet by mouth daily at 2 pm.      sildenafil (REVATIO) 20 MG tablet Take 1 tablet (20 mg) by mouth 3 times daily.      study - aspirin, IDS# [id number], 81 mg tablet Take 1 tablet by mouth daily.      torsemide (DEMADEX) 20 MG tablet Take 20 mg by mouth daily.      traZODone (DESYREL) 50 MG tablet       TRAZODONE HCL PO Take 100 mg by mouth At Bedtime.       No current facility-administered medications for this visit.       ROS:   10 point ROS negative except as discussed in above HPI.    SOCIAL HISTORY:  She is a .  Her   approximately 4 years ago.  She lives by herself independently in [facility].  She has 4 grown kids.  She worked as a  in [employer] for many years.  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  She does not smoke.  Her  smoked very briefly before their first child was born.  She does not drink alcohol.    FAMILY HISTORY:  No family history of pulmonary hypertension, chronic lung disease, or heart disease.    EXAM:  /73 (BP Location: Left arm, Patient Position: Sitting, Cuff Size: Adult Small)   Pulse 106   Wt 46.3 kg (102 lb)   BMI 20.60 kg/m    Awake, alert, and oriented x 3.   Comfortable. No apparent distress.  No pallor, cyanosis, or clubbing  Carotids 2+ bilateral and pulse was regular in rhythm.  Jugular venous distention visible at 6 cm above manubrium sternal angle.  Heart: regular, normal S1/loud P2, no murmur, gallop, rub  Lungs: NVBS and clear to auscultation bilaterally, basal crepitations bilaterally   abdomen: soft, non-tender, bowel sounds present, no hepatosplenomegaly  Extremities: 1+ edema bilaterally.  Extremities were warm and well-perfused.  Neurological: No gross focal neurological  deficit    Labs:  Recent Results (from the past week)   Iron and iron binding capacity    Collection Time: 12/17/24  1:07 PM   Result Value Ref Range    Iron 63 37 - 145 ug/dL    Iron Binding Capacity 295 240 - 430 ug/dL    Iron Sat Index 21 15 - 46 %   CRP inflammation    Collection Time: 12/17/24  1:07 PM   Result Value Ref Range    CRP Inflammation 8.09 (H) <5.00 mg/L   TSH    Collection Time: 12/17/24  1:07 PM   Result Value Ref Range    TSH 1.75 0.30 - 4.20 uIU/mL   N terminal pro BNP outpatient    Collection Time: 12/17/24  1:07 PM   Result Value Ref Range    N Terminal Pro BNP Outpatient 8,368 (H) 0 - 1,800 pg/mL   INR    Collection Time: 12/17/24  1:07 PM   Result Value Ref Range    INR 1.17 (H) 0.85 - 1.15   CBC with platelets    Collection Time: 12/17/24  1:07 PM   Result Value Ref Range    WBC Count 8.0 4.0 - 11.0 10e3/uL    RBC Count 4.90 3.80 - 5.20 10e6/uL    Hemoglobin 15.9 (H) 11.7 - 15.7 g/dL    Hematocrit 48.7 (H) 35.0 - 47.0 %    MCV 99 78 - 100 fL    MCH 32.4 26.5 - 33.0 pg    MCHC 32.6 31.5 - 36.5 g/dL    RDW 14.6 10.0 - 15.0 %    Platelet Count 228 150 - 450 10e3/uL   Hepatic panel    Collection Time: 12/17/24  1:07 PM   Result Value Ref Range    Protein Total 8.7 (H) 6.4 - 8.3 g/dL    Albumin 4.5 3.5 - 5.2 g/dL    Bilirubin Total 0.7 <=1.2 mg/dL    Alkaline Phosphatase 99 40 - 150 U/L    AST 22 0 - 45 U/L    ALT 7 0 - 50 U/L    Bilirubin Direct 0.25 0.00 - 0.30 mg/dL   Basic metabolic panel    Collection Time: 12/17/24  1:07 PM   Result Value Ref Range    Sodium 142 135 - 145 mmol/L    Potassium 4.1 3.4 - 5.3 mmol/L    Chloride 100 98 - 107 mmol/L    Carbon Dioxide (CO2) 26 22 - 29 mmol/L    Anion Gap 16 (H) 7 - 15 mmol/L    Urea Nitrogen 15.2 8.0 - 23.0 mg/dL    Creatinine 1.19 (H) 0.51 - 0.95 mg/dL    GFR Estimate 45 (L) >60 mL/min/1.73m2    Calcium 9.5 8.8 - 10.4 mg/dL    Glucose 120 (H) 70 - 99 mg/dL     EKG (September 2024):  Sinus rhythm, right axis deviation, T wave inversions in  anterolateral and inferior leads    Echocardiogram (September 2024):  1. Sinus rhythm during study.     2. Normal LV size with normal wall thickness. Visually estimated LVEF 65-70%. LV appears underfilled. No regional wall motion abnormalities. (Normal function). Normal diastolic function.     3. RV is enlarged in size with moderately decreased systolic function.     4. D shaped septum suggestive of RV pressure/volume overload.     5. Normal LA size.     6. Severe RA enlargement.     7. Moderate aortic valve regurgitation.     8. There is moderate-severe tricuspid valve regurgitation.     9. The estimated pulmonary artery systolic pressure is 56 mmHg.     10. Trileaflet aortic valve.     11. Compared to the prior study from 2024, the current study reveals worsened TR based on colorflow Doppler, RV enlargement, underfilled LV. Consider cardiology consult and right heart catherization.     RHC (September 2024):  Her RA pressure was 10 mmHg, RV was 79 over 15 mmHg, PA was 80/23 with a mean of 44 mmHg, pulmonary capillary wedge pressure of 9, aortic saturation 96%, PA saturation of 58.8, thermodilution cardiac output of 3.03 L/min, index of 2.3 L/min/m , and PVR of 11.5 Wood units.  She had acute vasodilator testing with inhaled nitric oxide.  With inhaled nitric oxide her PA pressure dropped to 66/18 with a mean of 37, PA saturation was 79%, aortic saturation was 100%, thermodilution cardiac output of 3.3 with an index of 2.5, pulmonary capital wedge pressure of 12 mmHg and 7.7 Wood units.      PFT (September 2024)  Her pulmonary function test showed an FVC of 87% predicted, FEV1 of 84% predicted, FEV1 by FVC of 74% predicted, TLC of 72% predicted, and DLCO of 16% predicted.    V/Q scan (October 2024)  Low probability for chronic thromboembolic disease    CT Chest (December 2023)  1.  No evidence of pulmonary embolism. The main pulmonary artery is enlarged measuring 3.6 cm in diameter, this can be seen with pulmonary  hypertension.   2.  Cardiomegaly, and reflux of contrast into the IVC and hepatic veins, suggesting right heart dysfunction.   3.  Upper and basilar predominant emphysematous changes.       Assessment and Plan:     In summary, Christina Novoa is a extremely delightful 84-year-old female with a recent diagnosis of cystic lung disease, severe precapillary pulmonary hypertension and right heart failure who is referred to us for further evaluation management.    1.  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  Pulmonary arterial hypertension out of proportion to chronic lung disease  3.  Right heart failure with moderate RV dilatation and dysfunction  4.  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  Her pulmonary capillary wedge pressure was within normal limits.  She has no echocardiographic evidence of left heart disease.  Her VQ scan was low probability.  Her CT chest shows bilateral cystic lung disease.  Her PFT shows significant reduction in DLCO.  Her lung volumes were not that abnormal.    She is also in decompensated right heart failure.  Clinically she has mild elevation in JVD with lower extremity edema.  Her NT proBNP is significantly elevated.    To be thorough, I have recommended her to complete the serological workup for pulmonary hypertension to rule out other associated causes.  We will also do a 6-minute walk test to assess her exercise capacity and her oxygen requirements.    I discussed the diagnosis, the treatment options, and overall outcome.  The safety and efficacy of pulmonary vasodilator therapy in patients with pulmonary arterial hypertension out of proportion to chronic lung disease is unclear.  Observational and retrospective data suggest that PDE 5 inhibitors improves right ventricular function, hemodynamics  and may be survival.  There is a theoretical risk of worsening VQ mismatch and hypoxemia.      I had a very clear discussion with the lack of clear efficacy and safety data in this particular situation with her and her son.  They understand the risk and benefits and would like to try sildenafil therapy which I think is reasonable.  However,  first, I have recommended her to have a 6-minute walk test with O2 titration to determine how much oxygen she needs.  Will do this prior to starting sildenafil therapy.  Will repeat her 6-minute walk test in 4 to 6 weeks after she starts sildenafil to make sure that she is not having worsening VQ mismatch and hypoxemia.  I suspect that she likely will need supplemental oxygen with exertion which is much more important than sildenafil.  She is not a candidate for lung transplantation given her age.    In the interim, I have recommended her to increase her torsemide to 40 mg daily.  We also discussed the importance of low-salt diet. If her blood pressure is low on sildenafil we will consider stopping her amlodipine.  She will return to see us in 4 to 6 weeks after starting sildenafil therapy with a repeat 6-minute walk test.  She will call us in the interim should any further worsening symptoms    It was a pleasure seeing Christina Novoa at the [facility]. Please contact us with any questions or concerns that you may have. We thank you for involving us in this patients care.    Total time today was 80 minutes reviewing notes, imaging, labs, patient visit, orders and documentation     Sincerely,      Christofer Beaulieu MD  Professor of Medicine  [facility]  Heart Failure, Transplant, and Mechanical Circulatory Support Cardiology   Cardiovascular Division  [facility]   [phone]

## 2024-12-18 ENCOUNTER — TELEPHONE (OUTPATIENT)
Dept: CARDIOLOGY | Facility: CLINIC | Age: 84
End: 2024-12-18
Payer: COMMERCIAL

## 2024-12-18 DIAGNOSIS — R06.02 SOB (SHORTNESS OF BREATH): ICD-10-CM

## 2024-12-18 DIAGNOSIS — I27.20 PULMONARY HYPERTENSION (H): Primary | ICD-10-CM

## 2024-12-18 LAB
ANA PAT SER IF-IMP: ABNORMAL
ANA SER QL IF: POSITIVE
ANA TITR SER IF: ABNORMAL {TITER}
CHOLEST SERPL-MCNC: 140 MG/DL
DRVVT SCREEN RATIO: 0.87
FASTING STATUS PATIENT QL REPORTED: NORMAL
HBV CORE AB SERPL QL IA: NONREACTIVE
HBV SURFACE AB SERPL IA-ACNC: <3.5 M[IU]/ML
HBV SURFACE AB SERPL IA-ACNC: NONREACTIVE M[IU]/ML
HBV SURFACE AG SERPL QL IA: NONREACTIVE
HCV AB SERPL QL IA: NONREACTIVE
HDLC SERPL-MCNC: 67 MG/DL
HIV 1+2 AB+HIV1 P24 AG SERPL QL IA: NONREACTIVE
LA PPP-IMP: NEGATIVE
LDLC SERPL CALC-MCNC: 57 MG/DL
LUPUS INTERPRETATION: NORMAL
NONHDLC SERPL-MCNC: 73 MG/DL
PTT RATIO: 0.94
RHEUMATOID FACT SERPL-ACNC: 18 IU/ML
THROMBIN TIME: 17.7 SECONDS (ref 13–19)
TRIGL SERPL-MCNC: 79 MG/DL

## 2024-12-18 RX ORDER — SILDENAFIL CITRATE 20 MG/1
20 TABLET ORAL 3 TIMES DAILY
Qty: 90 TABLET | Refills: 11 | Status: SHIPPED | OUTPATIENT
Start: 2024-12-18

## 2024-12-18 NOTE — TELEPHONE ENCOUNTER
Spoke with pt and told her the below plan and updated her pharmacy.  Pt verbalized understanding and had no questions at the time of instruction.    Ward Moe RN on 12/18/2024 at 9:18 AM        ----- Message from Nadia DHALIWAL sent at 12/18/2024  6:48 AM CST -----    ----- Message -----  From: Christofer Beaulieu MD  Sent: 12/17/2024   5:48 PM CST  To: Cardiology Ph Nurse-    Team,    This is a new patient who I saw today.  Her NT proBNP is significantly elevated.  She also has lower extremity edema.  I would appreciate if you can call her and increase her torsemide to 40 mg daily.     Please schedule for a repeat BMP and BNP when she returns to see us in January.    Thank you    TT

## 2024-12-18 NOTE — TELEPHONE ENCOUNTER
Spoke with pt about her sildenafil prescription and her needing to schedule her 6mwt with titration before starting her new medication.  She also needs another 6mwt with 02 titration prior to her follow-up appt with Christofer on 1/21/25.    Ward Moe RN on 12/18/2024 at 1:08 PM

## 2024-12-18 NOTE — TELEPHONE ENCOUNTER
----- Message from Annalisa CEJA sent at 12/17/2024  4:56 PM CST -----  Christofer Borja MD would like patient     to start on : sildenafil 20 mg (generic)       Dx Code:  I27.23 - PH ILD -PH due to lung diseases and hypoxia  WHO Group :   1  FC:  III      Appointments needed :      I was not able to talk with the patient prior to her leaving as we had an urgent issue come up. TT said she understood the plan but I would call and re-enforce it    1: TT would like a 6MWT at  with titrations PRIOR to starting sildenafil. Please ask her to call [phone]   2. START sildenafil. PAH oop to ILD  3. Follow-up (add on) at [facility] 1/21/24 at 11:20 with a walk test after starting sildenafil    I did not ask if that date would work for her. We can't add on patients after 5pm at  so hopefully the 11:20 works. She will need to schedule the 2 walk tests    k if you have questions

## 2024-12-18 NOTE — TELEPHONE ENCOUNTER
12/18/2024  @ 10:12 AM -  sildenafil 20 mg (90/30) - new start      PA Initiation  Medication: sildenafil 20 mg (90/30) - new start    Insurance Company: [employer] - Phone [phone] Fax [phone]:  [employer] PART D  Pharmacy Filling the Rx:    Filling Pharmacy Phone:    Filling Pharmacy Fax:    Start Date: 12/18/2024  via CMM, key [id number], w/ [facility] dated : 9/27/24; Dx Code: I 27.2 OOP ILD [I27.23 PH ILD]     ----------------------------  Insurance: [employer] PART D   BIN: [id number]  PCN: [id number]  RX GRP#: [id number]  ID#: [id number]          Nicky GOMEZ CMA- Prior Auths  Cardiology/Pulmonary Hypertension

## 2024-12-18 NOTE — TELEPHONE ENCOUNTER
12/18/2024  @ 12:30 PM -  sildenafil 20 mg (90/30) - Approved today by [id number]  Approved. Sildenafil is approved at up to the FDA-approved daily dose of 60 mg.  Authorization Expiration Date: 12/18/2025    Prior Authorization Approval  Medication: SILDENAFIL CITRATE 20 MG PO TABS  Authorization Effective Date: 12/18/2024  Authorization Expiration Date: 12/18/2025  Approved Dose/Quantity: 90/30  Reference #: [id number]   Insurance Company: [employer] - Phone [phone] Fax [phone]:  [employer] PART D  Expected CoPay: $    CoPay Card Available:      Financial Assistance Needed: *Undetermined as of the date of this note   Which Pharm[facility] is filling the prescription: [id number] [place], [place] - [address] [facility]  Pharmacy Notified: y  Patient Notified: y    Updated Snapshot, added to PA Calendar; Routed to Ward Moe RN to send new Rx.     Nicky GOMEZ CMA- Prior Auths  Cardiology/Pulmonary Hypertension

## 2024-12-19 LAB — STFR SERPL-MCNC: 3.8 MG/L

## 2024-12-20 LAB — IL6 SERPL-MCNC: 13.22 PG/ML

## 2025-01-10 ENCOUNTER — HOSPITAL ENCOUNTER (OUTPATIENT)
Dept: RESPIRATORY THERAPY | Facility: CLINIC | Age: 85
Discharge: HOME | End: 2025-01-10
Attending: INTERNAL MEDICINE | Admitting: INTERNAL MEDICINE
Payer: COMMERCIAL

## 2025-01-10 DIAGNOSIS — I27.21 PULMONARY ARTERIAL HYPERTENSION (H): ICD-10-CM

## 2025-01-10 DIAGNOSIS — R06.02 SOB (SHORTNESS OF BREATH): Primary | ICD-10-CM

## 2025-01-10 PROCEDURE — 94618 PULMONARY STRESS TESTING: CPT

## 2025-01-10 PROCEDURE — 999N000157 HC STATISTIC RCP TIME EA 10 MIN

## 2025-01-12 NOTE — PROGRESS NOTES
Six Minute Walk Test:     Probe: (Forehead probe.) Stops: (1) due to (SOB.) Duration: (30 seconds)   Patient walked (600 ft /182.88 m) in 6 minutes. LLN = (1027.58 Ft /313.21 m)   O2 system: (Choose an item.) (staff or patient carrying)   Walking aides: none  Pre-walk: SP02 (90%) Heart Rate (103) Barrett Dyspnea = (0) Fatigue = (0) BP (122/60)   On RA at rest: 90%  On RA: Lowest SPO2 (83%) Highest HR: (105)   On 2LPM: Lowest SPO2 (84%) Highest HR: (114)   On 3LPM: Lowest SPO2 (88%) Highest HR: (116)   Post-walk: SP02 (90%) Heart Rate (113 ) Barrett Dyspnea = (0) Fatigue = (0)   Recovery time to baseline 02 SAT (90%:4 ) minutes and HR (106: 4) minutes.     BP after 1 minute Rest: 128/64                               BP after 5 minutes Rest: 119/60   See  in epic for full report of 6-minute walk.     Pt required no O2 @ rest  Pt required 3 LPM NC with activity

## 2025-01-13 LAB — FIO2-PRE: 21 %

## 2025-01-15 ENCOUNTER — TELEPHONE (OUTPATIENT)
Dept: CARDIOLOGY | Facility: CLINIC | Age: 85
End: 2025-01-15
Payer: COMMERCIAL

## 2025-01-15 NOTE — TELEPHONE ENCOUNTER
LVM for pt regarding the sequence of her plan listed below.  Requested pt return call to verify completion of first 6mwt and initiation of sildenafil.    Ward Moe, RN on 1/15/2025 at 3:53 PM      ----- Message from Annalisa CEJA sent at 1/10/2025 10:00 AM CST -----  I called her and touched base on this however she still doesn't have another walk test scheduled. I have talked with her twice regarding the plan. Unsure why she is not scheduling    Called patient to touch base on plan for sildenafil  1: complete walk test prior (scheduled on 1/10)  2: Start sildenafil the following day 1/11  3: Complete another walk test after starting sildenafil prior to follow-up on 1/21      Can you confirm Monday that she started the sildenafil and have her make another walk test apt  ----- Message -----  From: Annalisa Worley RN  Sent: 12/31/2024   3:24 PM CST  To: Annalisa Worley RN; Ward Moe RN; #    Called and talked with the patient today. She is going to call and set up the walk test. Little delayed but she was busy over the holidays. She knows to hold off on starting the sildenafil until after the fact.     We may need to delay the additional walk test for after she starts the sildenafil as planned but this most likely wont tally up with the follow-up in Jan TT- should we still keep that apt?  ----- Message -----  From: Annalisa Worley RN  Sent: 12/17/2024   5:07 PM CST  To: Annalisa Worley RN; Nicky Perez [facility]; #    Christofer Borja MD would like patient     to start on : sildenafil 20 mg (generic)       Dx Code:  I27.23 - PH ILD -PH due to lung diseases and hypoxia  WHO Group :   1  FC:  III      Appointments needed :      I was not able to talk with the patient prior to her leaving as we had an urgent issue come up. TT said she understood the plan but I would call and re-enforce it    1: TT would like a 6MWT at  with titrations PRIOR to starting sildenafil. Please ask her to  call [phone]   2. START sildenafil. PAH oop to ILD  3. Follow-up (add on) at [facility] 1/21/24 at 11:20 with a walk test after starting sildenafil    I did not ask if that date would work for her. We can't add on patients after 5pm at  so hopefully the 11:20 works. She will need to schedule the 2 walk tests    Lmk if you have questions

## 2025-01-16 NOTE — TELEPHONE ENCOUNTER
Patient completed walk test. Has not picked up sildenafil yet. Will  tomorrow per patent report. Needs to schedule another walk test as planned. Phone number provided to patient. Requested she complete prior to 1/21 apt as planned.     Annalisa Worley RN on 1/16/2025 at 10:39 AM

## 2025-02-26 DIAGNOSIS — I27.20 PULMONARY HYPERTENSION (H): Primary | ICD-10-CM

## 2025-02-26 DIAGNOSIS — R06.02 SOB (SHORTNESS OF BREATH): ICD-10-CM

## 2025-02-26 NOTE — TELEPHONE ENCOUNTER
[facility] Call Center    Phone Message    May a detailed message be left on voicemail: yes     Reason for Call: Medication Refill Request    Has the patient contacted the pharmacy for the refill? Yes   Name of medication being requested: potassium chloride ER (K-TAB) 20 MEQ CR tablet  amLODIPine (NORVASC) 2.5 MG tablet  gabapentin (NEURONTIN) 100 MG capsule  sildenafil (REVATIO) 20 MG tablet  torsemide (DEMADEX) 20 MG tablet  Provider who prescribed the medication: Dr. Beaulieu  Pharmacy: [facility]  Date medication is needed: 3/3/25  Patient would like new rx's for all of these prescriptions to be sent to [facility] pharmacy. She is switching pharmacies.        Action Taken: Other: cardiology    Travel Screening: Not Applicable  Thank you!  Special[facility]       Date of Service:

## 2025-02-27 RX ORDER — TRAZODONE HYDROCHLORIDE 50 MG/1
TABLET ORAL
Status: CANCELLED | OUTPATIENT
Start: 2025-02-27

## 2025-02-27 RX ORDER — GABAPENTIN 100 MG/1
100 CAPSULE ORAL AT BEDTIME
Status: CANCELLED | OUTPATIENT
Start: 2025-02-27

## 2025-02-27 NOTE — TELEPHONE ENCOUNTER
torsemide (DEMADEX) 20 MG tablet   Historical      potassium chloride ER (K-TAB) 20 MEQ CR tablet   Historical    gabapentin (NEURONTIN) 100 MG capsule  historical     amLODIPine (NORVASC) 2.5 MG tablet   Historical    sildenafil (REVATIO) 20 MG tablet 90 tablet 11 12/18/2024 -- No   Sig - Route: Take 1 tablet (20 mg) by mouth 3 times daily. - Oral       Christofer Beaulieu MD  Cardiovascular Disease  Lv 12/17/24  Nv 5/27/25      Request per pt call.

## 2025-02-28 RX ORDER — TORSEMIDE 20 MG/1
40 TABLET ORAL DAILY
Qty: 180 TABLET | Refills: 1 | Status: SHIPPED | OUTPATIENT
Start: 2025-02-28

## 2025-02-28 RX ORDER — SILDENAFIL CITRATE 20 MG/1
20 TABLET ORAL 3 TIMES DAILY
Qty: 90 TABLET | Refills: 9 | Status: SHIPPED | OUTPATIENT
Start: 2025-02-28

## 2025-02-28 RX ORDER — POTASSIUM CHLORIDE 1500 MG/1
20 TABLET, EXTENDED RELEASE ORAL
Qty: 90 TABLET | Refills: 1 | Status: SHIPPED | OUTPATIENT
Start: 2025-02-28

## 2025-02-28 RX ORDER — AMLODIPINE BESYLATE 2.5 MG/1
2.5 TABLET ORAL DAILY
Qty: 90 TABLET | Refills: 1 | Status: SHIPPED | OUTPATIENT
Start: 2025-02-28

## 2025-02-28 NOTE — TELEPHONE ENCOUNTER
Medication Refill double check:    Last office visit was on 12/17/24 with  @ .    Follow up was recommended for 4-6 weeks.    Any additional encounters with changes to requested med? no    Authorizing provider is: Dr. Beaulieu    Refill was approved.     Additional orders/notes: Gabapentin is not a Cardiology med, so I did not refill this.  Routed encounter to SARA Hopper to follow up on 6mwt.      Collette Garcia RN on 2/28/2025 at 12:58 PM

## 2025-03-07 ENCOUNTER — HOSPITAL ENCOUNTER (OUTPATIENT)
Dept: RESPIRATORY THERAPY | Facility: CLINIC | Age: 85
Discharge: HOME | End: 2025-03-07
Attending: INTERNAL MEDICINE | Admitting: INTERNAL MEDICINE
Payer: COMMERCIAL

## 2025-03-07 DIAGNOSIS — I27.20 PULMONARY HYPERTENSION (H): ICD-10-CM

## 2025-03-07 DIAGNOSIS — R06.02 SOB (SHORTNESS OF BREATH): ICD-10-CM

## 2025-03-07 PROCEDURE — 94618 PULMONARY STRESS TESTING: CPT

## 2025-03-07 PROCEDURE — 999N000157 HC STATISTIC RCP TIME EA 10 MIN

## 2025-03-07 NOTE — PROGRESS NOTES
Six Minute Walk Test:   On RA at rest: 95%,   On RA with Activity: Lowest SPO2 (85%%) Highest HR: (124) .Pt desaturated to 85% on ROOM AIR after walking 2 minutes and 30 seconds (224 Ft)  RT placed pt placed on 2 lpm to rest.     WALK DONE ON 2 lpm NC  Probe: (Ear probe, pt has very cold hands and probe does not  on fingers.) Stops: (0)  pt used railing to walk.   Patient walked (672 Ft /204.83 m) in 6 minutes. LLN = (1027.58 Ft /313.21m)   O2 system: (portable C staff carrying)   Pre-walk2 lpm at rest: SP02 (99%) Heart Rate (102) Barrett Dyspnea = (0) Fatigue = (0) BP (64315)    On 2LPM with activity: Lowest SPO2 (89%) Highest HR: (121)   Post-walk: SP02 (89%) Heart Rate (121 ) Barrett Dyspnea = (0) Fatigue = (0)   Recovery time to baseline 02 SAT (3) minutes and HR (2) minutes.                              Patient reports walk distance may have been affected by nothing, stated she was not short of breath or fatigued.  BP after 1 minute Rest:     120/63        BP after 5 minutes Rest:     118/63                    See  in epic for full report of 6-minute walk. PT left in no distress.

## 2025-03-08 LAB — FIO2-PRE: 28 %

## 2025-04-28 ENCOUNTER — TELEPHONE (OUTPATIENT)
Dept: CARDIOLOGY | Facility: CLINIC | Age: 85
End: 2025-04-28
Payer: COMMERCIAL

## 2025-04-28 NOTE — TELEPHONE ENCOUNTER
Returned pt call in regards to her noticing some increased swelling around her mid-section.  Wanted to check in about her sildenafil and if she's still taking that regularly and also her diuretic regimen.  Requested call back.    Ward Moe RN on 4/28/2025 at 1:24 PM   Acitretin Counseling:  I discussed with the patient the risks of acitretin including but not limited to hair loss, dry lips/skin/eyes, liver damage, hyperlipidemia, depression/suicidal ideation, photosensitivity.  Serious rare side effects can include but are not limited to pancreatitis, pseudotumor cerebri, bony changes, clot formation/stroke/heart attack.  Patient understands that alcohol is contraindicated since it can result in liver toxicity and significantly prolong the elimination of the drug by many years.

## 2025-04-29 ENCOUNTER — TELEPHONE (OUTPATIENT)
Dept: CARDIOLOGY | Facility: CLINIC | Age: 85
End: 2025-04-29
Payer: COMMERCIAL

## 2025-04-29 NOTE — TELEPHONE ENCOUNTER
[facility] Call Center    Phone Message    May a detailed message be left on voicemail: yes     Reason for Call: Symptoms or Concerns     If patient has red-flag symptoms, warm transfer to triage line    Current symptom or concern: Water weight gain, 3-4 lbs in the past few weeks. The patient said the weight is all around her middle, her lower legs are looking pretty good by the end of the day.  She said her middle section is uncomfortable and she has been SOB in the past 1-2 weeks. The patient only uses oxygen intermittently  No SOB at time of call because pt was on oxygen and resting        Action Taken: Other: Cardiology    Travel Screening: Not Applicable    Thank you!  [facility]       Date of Service:

## 2025-04-29 NOTE — TELEPHONE ENCOUNTER
LVM for pt to call back when able to follow-up with some questions regarding her increased water weight.    Ward Moe RN on 4/29/2025 at 3:08 PM

## 2025-04-30 NOTE — TELEPHONE ENCOUNTER
Health Call Center    Phone Message    May a detailed message be left on voicemail: yes     Reason for Call: Symptoms or Concerns     If patient has red-flag symptoms, warm transfer to triage line    Current symptom or concern: Increased swelling     Patient stated they are having increased swelling in the upper and middle of the legs, but lower legs to the ankle is fine. Patient also mentioned they are feeling a little weaker than usual, and they are using oxygen for shortness of breath but doesn't seem to be working as much. Offered to transfer patient to triage, but declined. Please call patient back to further discuss.    Action Taken: Other: Cardiology    Travel Screening: Not Applicable     Thank you!  [facility]

## 2025-05-01 NOTE — TELEPHONE ENCOUNTER
Tried to reach pt, but number went right to voicemail.  Requested a call back and left my direct number so we can connect.    Ward Moe RN on 5/1/2025 at 1:01 PM

## 2025-05-14 ENCOUNTER — TELEPHONE (OUTPATIENT)
Dept: CARDIOLOGY | Facility: CLINIC | Age: 85
End: 2025-05-14
Payer: COMMERCIAL

## 2025-05-14 DIAGNOSIS — R06.09 DOE (DYSPNEA ON EXERTION): Primary | ICD-10-CM

## 2025-05-14 NOTE — TELEPHONE ENCOUNTER
Patient called to update local ED visit. Increased weight    1: Recently admitted locally for          PE started on Eliquis. She is having more nose bleeds with oxygen use. Advised to  try Ayr gel.          Pleurall effusion/respiratory failure exacerbation- IV diuresis. Continued on torsemide 20mg with discharge weight of 107lb. Her baseline she reports is closer to 95-100lb. Current weight 104    2: She reports she is having abdominal swelling, swelling to BLE. Her weight yesterday was 104lb. She had a follow-up with her PCP yesterday and they increased her torsemide to 40mg AM/20mg PM. She is wondering if we should make additional changes      Discussed with Dr. Beaulieu. Keep increased regimen for now. Update weights on Friday. If not decreasing below 104lb we may adjust to 40mg BID. Repeat labs in 2 weeks at scheduled follow-up     Annalisa Worley RN on 5/14/2025 at 9:47 AM

## 2025-05-27 ENCOUNTER — OFFICE VISIT (OUTPATIENT)
Dept: CARDIOLOGY | Facility: CLINIC | Age: 85
End: 2025-05-27
Payer: COMMERCIAL

## 2025-05-27 VITALS
HEART RATE: 56 BPM | WEIGHT: 105.6 LBS | SYSTOLIC BLOOD PRESSURE: 100 MMHG | RESPIRATION RATE: 16 BRPM | DIASTOLIC BLOOD PRESSURE: 52 MMHG | BODY MASS INDEX: 21.33 KG/M2

## 2025-05-27 DIAGNOSIS — R06.02 SOB (SHORTNESS OF BREATH): ICD-10-CM

## 2025-05-27 DIAGNOSIS — I27.20 PULMONARY HYPERTENSION (H): ICD-10-CM

## 2025-05-27 RX ORDER — TORSEMIDE 20 MG/1
40 TABLET ORAL 2 TIMES DAILY
Qty: 360 TABLET | Refills: 3 | Status: SHIPPED | OUTPATIENT
Start: 2025-05-27

## 2025-05-27 NOTE — NURSING NOTE
"Patient educated to the following during visit and educated to contact RN or MD for any questions.     Plan reviewed with patient:   Talked with patient and son in room after visit with MD  Admit to hospital for RHF and 20lb weight gain- for  IV diuresis. Notified inpatient team and RNCC  Follow-up after hospitalization  INCREASE torsemide to 40mg BID for now        Reviewed Med list and verified all medications with patient.   Marked chart \"Ready for Checkout\"  Sent msg to Ward Moe RN (P: [phone])      Patient verbalized understanding of plan and follow-up and agreed to call with further questions or concerns.     Patient will be scheduled after hospital discharge    Annalisa Worley RN      "

## 2025-05-27 NOTE — PROGRESS NOTES
Service Date: 2024    RE:  Christina Novoa   MRN:  [id number]  :  1940      Dear Dr. Valdes:    We had the pleasure of seeing Christina Novoa at the [facility].  As you know, she is a 85-year-old female with COPD associated pulmonary arterial hypertension.  She is currently on sildenafil monotherapy.  She returns today for follow-up.    We last saw her in December.  We for started her on torsemide.  With this, she lost 20 pounds and felt better.  Subsequently she was started on sildenafil in mid January.  She had a 6-minute walk test done before and after starting sildenafil.  She did not have worsening exertional hypoxemia after being on sildenafil.  In fact her oxygen requirements are slightly better.  She was subsequently doing okay with no significant improvement in her symptoms after being on sildenafil.    However, she started having increased lower extremity swelling, abdominal distention and weight gain starting in April.  She was subsequently admitted to [facility] in early May.  Clinically, she was in right heart failure.  CT pulmonary angiogram showed segmental and subsegmental pulmonary embolism in the right lower lobe.  Echocardiogram showed moderate to severely reduced right ventricular function with severe right ventricular dilatation.  Her IVC was dilated suggesting volume overload.      She was started on apixaban. She underwent intravenous diuresis and was discharged home in 48 hours.  However she still continues to struggle with lower extremity swelling and abdominal distention.  She still has significant exertional shortness of breath.  Her weight is still 20 pounds above her dry weight.  She still lives alone.  Able to do most of her activities.  I would currently characterize her as NYHA functional class III.  No exertional chest pain or chest pressure.  No exertional presyncope or syncope.  She has been using her oxygen 3  L/min more often than before.    PAST MEDICAL HISTORY:  1.  Systemic hypertension  2.  Hyperlipidemia  3.  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  Recent diagnosis of pulmonary hypertension and right heart failure    PAST SURGICAL HISTORY:  Cataract surgery    CURRENT MEDICATIONS:  Current Outpatient Medications   Medication Sig Dispense Refill    amLODIPine (NORVASC) 2.5 MG tablet Take 1 tablet (2.5 mg) by mouth daily. 90 tablet 1    apixaban ANTICOAGULANT (ELIQUIS) 5 MG tablet Take 5 mg by mouth 2 times daily.      calcium carbonate (TUMS) 500 MG chewable tablet Take 500 mg by mouth.      gabapentin (NEURONTIN) 100 MG capsule Take 100 mg by mouth at bedtime.      magnesium oxide (MAG-OX) 400 MG tablet Take 1 tablet by mouth daily.      multivitamin, therapeutic (THERA-VIT) TABS Take 1 tablet by mouth daily      potassium chloride ER (K-TAB) 20 MEQ CR tablet Take 1 tablet (20 mEq) by mouth daily at 2 pm. 90 tablet 1    sildenafil (REVATIO) 20 MG tablet Take 1 tablet (20 mg) by mouth 3 times daily. 90 tablet 9    study - aspirin, IDS# [id number], 81 mg tablet Take 1 tablet by mouth daily.      torsemide (DEMADEX) 20 MG tablet Take 2 tablets (40 mg) by mouth 2 times daily. 360 tablet 3    traZODone (DESYREL) 50 MG tablet       TRAZODONE HCL PO Take 100 mg by mouth At Bedtime.       No current facility-administered medications for this visit.       ROS:   10 point ROS negative except as discussed in above HPI.    SOCIAL HISTORY:  She is a .  Her   approximately 4 years ago.  She lives by herself independently in [place].  She has 4 grown kids.  She worked as a  in [employer] for many years.  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  She does not smoke.  Her  smoked very briefly before their first child was born.  She does not drink alcohol.    FAMILY HISTORY:  No family history of pulmonary hypertension, chronic lung  disease, or heart disease.    EXAM:  /52 (BP Location: Right arm, Patient Position: Sitting, Cuff Size: Adult Regular)   Pulse 56   Resp 16   Wt 47.9 kg (105 lb 9.6 oz)   BMI 21.33 kg/m    Awake, alert, and oriented x 3.   Comfortable. No apparent distress.  No pallor, cyanosis, or clubbing  Carotids 2+ bilateral and pulse was regular in rhythm.  Jugular venous distention visible at the angle of the jaw.    Heart: regular, normal S1/loud P2, no murmur, gallop, rub  Lungs: NVBS and clear to auscultation bilaterally, basal crepitations bilaterally   abdomen: soft, non-tender, bowel sounds present, no hepatosplenomegaly  Extremities: 2+ + edema bilaterally.  Extremities were warm and well-perfused.  Neurological: No gross focal neurological deficit    Labs:    Her last chemistry showed a sodium of 137, potassium of 4.6, chloride of 98, bicarb of 22, BUN of 19, creatinine of 1.23, glucose of 114 and GFR of 43.    EKG (September 2024):  Sinus rhythm, right axis deviation, T wave inversions in anterolateral and inferior leads    Echocardiogram (September 2024):  1. Sinus rhythm during study.     2. Normal LV size with normal wall thickness. Visually estimated LVEF 65-70%. LV appears underfilled. No regional wall motion abnormalities. (Normal function). Normal diastolic function.     3. RV is enlarged in size with moderately decreased systolic function.     4. D shaped septum suggestive of RV pressure/volume overload.     5. Normal LA size.     6. Severe RA enlargement.     7. Moderate aortic valve regurgitation.     8. There is moderate-severe tricuspid valve regurgitation.     9. The estimated pulmonary artery systolic pressure is 56 mmHg.     10. Trileaflet aortic valve.     11. Compared to the prior study from 2024, the current study reveals worsened TR based on colorflow Doppler, RV enlargement, underfilled LV. Consider cardiology consult and right heart catherization.     RHC (September 2024):  Her RA  pressure was 10 mmHg, RV was 79 over 15 mmHg, PA was 80/23 with a mean of 44 mmHg, pulmonary capillary wedge pressure of 9, aortic saturation 96%, PA saturation of 58.8, thermodilution cardiac output of 3.03 L/min, index of 2.3 L/min/m , and PVR of 11.5 Wood units.  She had acute vasodilator testing with inhaled nitric oxide.  With inhaled nitric oxide her PA pressure dropped to 66/18 with a mean of 37, PA saturation was 79%, aortic saturation was 100%, thermodilution cardiac output of 3.3 with an index of 2.5, pulmonary capital wedge pressure of 12 mmHg and 7.7 Wood units.    PFT (September 2024)  Her pulmonary function test showed an FVC of 87% predicted, FEV1 of 84% predicted, FEV1 by FVC of 74% predicted, TLC of 72% predicted, and DLCO of 16% predicted.    V/Q scan (October 2024)  Low probability for chronic thromboembolic disease    CT Chest (December 2023)  1.  No evidence of pulmonary embolism. The main pulmonary artery is enlarged measuring 3.6 cm in diameter, this can be seen with pulmonary hypertension.   2.  Cardiomegaly, and reflux of contrast into the IVC and hepatic veins, suggesting right heart dysfunction.   3.  Upper and basilar predominant emphysematous changes.       Assessment and Plan:     In summary, Christina Novoa is a extremely delightful 85-year-old female with a recent diagnosis of cystic lung disease, severe precapillary pulmonary hypertension and right heart failure who is referred to us for further evaluation management.    1.  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  Pulmonary arterial hypertension out of proportion to chronic lung disease  3.  Decompensated right heart failure right heart failure with volume overload   4.  Recent subsegmental/segmental right lower lobe pulmonary embolism    Unfortunately, she is in decompensated right heart failure.  She is 20 pounds above her dry weight.  This is likely likely related to her underlying COPD associated pulmonary arterial  hypertension.  She also had recent pulmonary embolism.    I have recommended her to get admitted to the hospital for intravenous diuresis.  It will be difficult to diurese her with oral torsemide.  She has hesitantly agreed.  She would prefer to do direct admit rather than coming to the ER.  Will wait for a bed for 3 days.  If she does not get a bed I have recommended her to come to the ER.  In the interim I have recommended her to increase the torsemide to 40 mg twice daily. She will continue to use 3 L of supplemental oxygen 24/7.    I doubt that her decompensation is due to sildenafil.  She is not having worsening hypoxemia.  This appears to be more right heart failure from her pulmonary arterial hypertension.  Sildenafil may in fact be helping this.  Thus we will continue sildenafil for now.  Will stop this if she has worsening hypoxemia.  Will consider also adding digoxin during this hospitalization.     She is on apixaban for her pulmonary embolism.  Will continue this for a minimum of 6 months.    It was a pleasure seeing Christina Novoa at the [facility]. Please contact us with any questions or concerns that you may have. We thank you for involving us in this patients care.    Total time today was 50 minutes reviewing notes, imaging, labs, patient visit, orders and documentation     Sincerely,      Christofer Beaulieu MD  Professor of Medicine  [facility]  Heart Failure, Transplant, and Mechanical Circulatory Support Cardiology   Cardiovascular Division  [facility]   [phone]

## 2025-05-27 NOTE — PATIENT INSTRUCTIONS
You were seen today in the Pulmonary Hypertension Clinic at the [facility].     Cardiology Provider you saw during your visit:    Dr. Beaulieu    Medication Changes:  INCREASE torsemide to 40mg twice daily    Recommendations:   Admit to the [facility]. Schedule admit for 3PM tomorrow. If they do not have a bed available they will call you.     If you are not admitted by Friday due to bed availability please proceed to the emergency department.     The reason we are admitting you is you are in heart failure, you have retained 20lb of fluid     Follow-up:   Follow-up after hospitalizaiton    Please call us immediately if you have any syncope (fainting or passing out), chest pain, edema (swelling or weight gain), or decline in your functional status (general decline in how you are feeling).    If you have emergent concerns after hours or on the weekend, please call our on-call Cardiologist at [phone], option [phone]. For emergencies call 911.     Thank you for allowing us to be a part of your care here at the [facility]    Please visit the pulmonary hypertension website for more information and support. [email or web address]    If you have questions or concerns please contact us at:    Ward Moe RN (P: [phone])    Nurse Coordinator       Pulmonary Hypertension     [facility]         [place]   (Prior Auths & Patient Assistance )             ()  Clinic   Clinic   Pulmonary Hypertension   Pulmonary Hypertension  [facility]  [facility]  (P)[phone]    (P) [phone]  (F) [phone]

## 2025-05-28 ENCOUNTER — TELEPHONE (OUTPATIENT)
Dept: CARDIOLOGY | Facility: CLINIC | Age: 85
End: 2025-05-28
Payer: COMMERCIAL

## 2025-05-28 NOTE — TELEPHONE ENCOUNTER
Returned pt call regarding her admission for today, Thursday or Friday through the ED.  Informed pt that we are working on it and when we know we will follow-up and let her know when there is a bed available.  Pt verbalized understanding and had no questions at the time of instruction.      Ward Moe RN on 5/28/2025 at 2:08 PM

## 2025-05-29 NOTE — TELEPHONE ENCOUNTER
Spoke with pt placement regarding pt's admission.  Still no rooms available.  Pt notified of situation and to stay ready as pts get discharged throughout the day.  Pt verbalized understanding and had no questions at the time of instruction.      Ward Moe RN on 5/29/2025 at 10:09 AM

## 2025-05-30 ENCOUNTER — APPOINTMENT (OUTPATIENT)
Dept: GENERAL RADIOLOGY | Facility: CLINIC | Age: 85
End: 2025-05-30
Attending: STUDENT IN AN ORGANIZED HEALTH CARE EDUCATION/TRAINING PROGRAM
Payer: COMMERCIAL

## 2025-05-30 ENCOUNTER — HOSPITAL ENCOUNTER (INPATIENT)
Facility: CLINIC | Age: 85
LOS: 3 days | Discharge: HOME | End: 2025-06-02
Attending: FAMILY MEDICINE | Admitting: INTERNAL MEDICINE
Payer: COMMERCIAL

## 2025-05-30 DIAGNOSIS — I50.813 ACUTE ON CHRONIC RIGHT-SIDED CONGESTIVE HEART FAILURE (H): ICD-10-CM

## 2025-05-30 DIAGNOSIS — K21.00 GASTROESOPHAGEAL REFLUX DISEASE WITH ESOPHAGITIS WITHOUT HEMORRHAGE: ICD-10-CM

## 2025-05-30 DIAGNOSIS — I27.20 PULMONARY HYPERTENSION (H): ICD-10-CM

## 2025-05-30 DIAGNOSIS — R11.0 NAUSEA: Primary | ICD-10-CM

## 2025-05-30 PROBLEM — I50.9 HEART FAILURE (H): Status: ACTIVE | Noted: 2025-05-30

## 2025-05-30 LAB
ALBUMIN SERPL BCG-MCNC: 3.7 G/DL (ref 3.5–5.2)
ALBUMIN UR-MCNC: NEGATIVE MG/DL
ALP SERPL-CCNC: 92 U/L (ref 40–150)
ALT SERPL W P-5'-P-CCNC: <5 U/L (ref 0–50)
ANION GAP SERPL CALCULATED.3IONS-SCNC: 14 MMOL/L (ref 7–15)
APPEARANCE UR: ABNORMAL
AST SERPL W P-5'-P-CCNC: 19 U/L (ref 0–45)
ATRIAL RATE - MUSE: 99 BPM
BACTERIA #/AREA URNS HPF: ABNORMAL /HPF
BASOPHILS # BLD AUTO: 0.1 10E3/UL (ref 0–0.2)
BASOPHILS NFR BLD AUTO: 1 %
BILIRUB SERPL-MCNC: 1.3 MG/DL
BILIRUB UR QL STRIP: NEGATIVE
BILIRUBIN DIRECT (ROCHE PRO & PURE): 0.75 MG/DL (ref 0–0.45)
BUN SERPL-MCNC: 21.5 MG/DL (ref 8–23)
CALCIUM SERPL-MCNC: 8.5 MG/DL (ref 8.8–10.4)
CHLORIDE SERPL-SCNC: 97 MMOL/L (ref 98–107)
COLOR UR AUTO: YELLOW
CREAT SERPL-MCNC: 1.21 MG/DL (ref 0.51–0.95)
DIASTOLIC BLOOD PRESSURE - MUSE: NORMAL MMHG
EGFRCR SERPLBLD CKD-EPI 2021: 44 ML/MIN/1.73M2
EOSINOPHIL # BLD AUTO: 0.1 10E3/UL (ref 0–0.7)
EOSINOPHIL NFR BLD AUTO: 2 %
ERYTHROCYTE [DISTWIDTH] IN BLOOD BY AUTOMATED COUNT: 18.6 % (ref 10–15)
GLUCOSE SERPL-MCNC: 126 MG/DL (ref 70–99)
GLUCOSE UR STRIP-MCNC: NEGATIVE MG/DL
HCO3 SERPL-SCNC: 26 MMOL/L (ref 22–29)
HCT VFR BLD AUTO: 45.9 % (ref 35–47)
HGB BLD-MCNC: 14.5 G/DL (ref 11.7–15.7)
HGB UR QL STRIP: ABNORMAL
HYALINE CASTS: 3 /LPF
IMM GRANULOCYTES # BLD: 0 10E3/UL
IMM GRANULOCYTES NFR BLD: 1 %
INTERPRETATION ECG - MUSE: NORMAL
KETONES UR STRIP-MCNC: NEGATIVE MG/DL
LEUKOCYTE ESTERASE UR QL STRIP: NEGATIVE
LYMPHOCYTES # BLD AUTO: 0.9 10E3/UL (ref 0.8–5.3)
LYMPHOCYTES NFR BLD AUTO: 15 %
MAGNESIUM SERPL-MCNC: 1.8 MG/DL (ref 1.7–2.3)
MAGNESIUM SERPL-MCNC: 2.2 MG/DL (ref 1.7–2.3)
MCH RBC QN AUTO: 30.7 PG (ref 26.5–33)
MCHC RBC AUTO-ENTMCNC: 31.6 G/DL (ref 31.5–36.5)
MCV RBC AUTO: 97 FL (ref 78–100)
MONOCYTES # BLD AUTO: 0.7 10E3/UL (ref 0–1.3)
MONOCYTES NFR BLD AUTO: 13 %
MUCOUS THREADS #/AREA URNS LPF: PRESENT /LPF
NEUTROPHILS # BLD AUTO: 4 10E3/UL (ref 1.6–8.3)
NEUTROPHILS NFR BLD AUTO: 69 %
NITRATE UR QL: POSITIVE
NRBC # BLD AUTO: 0 10E3/UL
NRBC BLD AUTO-RTO: 0 /100
NT-PROBNP SERPL-MCNC: 5196 PG/ML (ref 0–624)
P AXIS - MUSE: 42 DEGREES
PH UR STRIP: 6.5 [PH] (ref 5–7)
PLATELET # BLD AUTO: 192 10E3/UL (ref 150–450)
POTASSIUM SERPL-SCNC: 3.5 MMOL/L (ref 3.4–5.3)
POTASSIUM SERPL-SCNC: 3.7 MMOL/L (ref 3.4–5.3)
PR INTERVAL - MUSE: 172 MS
PROT SERPL-MCNC: 7.3 G/DL (ref 6.4–8.3)
QRS DURATION - MUSE: 78 MS
QT - MUSE: 378 MS
QTC - MUSE: 485 MS
R AXIS - MUSE: 123 DEGREES
RBC # BLD AUTO: 4.73 10E6/UL (ref 3.8–5.2)
RBC URINE: 9 /HPF
SODIUM SERPL-SCNC: 137 MMOL/L (ref 135–145)
SP GR UR STRIP: 1.01 (ref 1–1.03)
SQUAMOUS EPITHELIAL: <1 /HPF
SYSTOLIC BLOOD PRESSURE - MUSE: NORMAL MMHG
T AXIS - MUSE: -18 DEGREES
TROPONIN T SERPL HS-MCNC: 24 NG/L
TROPONIN T SERPL HS-MCNC: 26 NG/L
UROBILINOGEN UR STRIP-MCNC: NORMAL MG/DL
VENTRICULAR RATE- MUSE: 99 BPM
WBC # BLD AUTO: 5.8 10E3/UL (ref 4–11)
WBC URINE: 1 /HPF

## 2025-05-30 PROCEDURE — 250N000011 HC RX IP 250 OP 636: Performed by: STUDENT IN AN ORGANIZED HEALTH CARE EDUCATION/TRAINING PROGRAM

## 2025-05-30 PROCEDURE — 71046 X-RAY EXAM CHEST 2 VIEWS: CPT | Mod: 26 | Performed by: RADIOLOGY

## 2025-05-30 PROCEDURE — 250N000011 HC RX IP 250 OP 636

## 2025-05-30 PROCEDURE — 99285 EMERGENCY DEPT VISIT HI MDM: CPT | Mod: FS | Performed by: FAMILY MEDICINE

## 2025-05-30 PROCEDURE — 87086 URINE CULTURE/COLONY COUNT: CPT | Performed by: STUDENT IN AN ORGANIZED HEALTH CARE EDUCATION/TRAINING PROGRAM

## 2025-05-30 PROCEDURE — 84484 ASSAY OF TROPONIN QUANT: CPT

## 2025-05-30 PROCEDURE — 85025 COMPLETE CBC W/AUTO DIFF WBC: CPT | Performed by: STUDENT IN AN ORGANIZED HEALTH CARE EDUCATION/TRAINING PROGRAM

## 2025-05-30 PROCEDURE — 82310 ASSAY OF CALCIUM: CPT | Performed by: STUDENT IN AN ORGANIZED HEALTH CARE EDUCATION/TRAINING PROGRAM

## 2025-05-30 PROCEDURE — 36415 COLL VENOUS BLD VENIPUNCTURE: CPT

## 2025-05-30 PROCEDURE — 120N000002 HC R&B MED SURG/OB UMMC

## 2025-05-30 PROCEDURE — 87186 SC STD MICRODIL/AGAR DIL: CPT | Performed by: STUDENT IN AN ORGANIZED HEALTH CARE EDUCATION/TRAINING PROGRAM

## 2025-05-30 PROCEDURE — 84132 ASSAY OF SERUM POTASSIUM: CPT

## 2025-05-30 PROCEDURE — 82435 ASSAY OF BLOOD CHLORIDE: CPT | Performed by: STUDENT IN AN ORGANIZED HEALTH CARE EDUCATION/TRAINING PROGRAM

## 2025-05-30 PROCEDURE — 81001 URINALYSIS AUTO W/SCOPE: CPT | Performed by: STUDENT IN AN ORGANIZED HEALTH CARE EDUCATION/TRAINING PROGRAM

## 2025-05-30 PROCEDURE — 82040 ASSAY OF SERUM ALBUMIN: CPT

## 2025-05-30 PROCEDURE — 250N000013 HC RX MED GY IP 250 OP 250 PS 637

## 2025-05-30 PROCEDURE — 36415 COLL VENOUS BLD VENIPUNCTURE: CPT | Performed by: STUDENT IN AN ORGANIZED HEALTH CARE EDUCATION/TRAINING PROGRAM

## 2025-05-30 PROCEDURE — 83735 ASSAY OF MAGNESIUM: CPT

## 2025-05-30 PROCEDURE — 84075 ASSAY ALKALINE PHOSPHATASE: CPT

## 2025-05-30 PROCEDURE — 93010 ELECTROCARDIOGRAM REPORT: CPT | Performed by: FAMILY MEDICINE

## 2025-05-30 PROCEDURE — 99285 EMERGENCY DEPT VISIT HI MDM: CPT | Mod: 25 | Performed by: FAMILY MEDICINE

## 2025-05-30 PROCEDURE — 84484 ASSAY OF TROPONIN QUANT: CPT | Performed by: STUDENT IN AN ORGANIZED HEALTH CARE EDUCATION/TRAINING PROGRAM

## 2025-05-30 PROCEDURE — 83880 ASSAY OF NATRIURETIC PEPTIDE: CPT | Performed by: STUDENT IN AN ORGANIZED HEALTH CARE EDUCATION/TRAINING PROGRAM

## 2025-05-30 PROCEDURE — 93005 ELECTROCARDIOGRAM TRACING: CPT | Performed by: FAMILY MEDICINE

## 2025-05-30 PROCEDURE — 85018 HEMOGLOBIN: CPT | Performed by: STUDENT IN AN ORGANIZED HEALTH CARE EDUCATION/TRAINING PROGRAM

## 2025-05-30 PROCEDURE — 71046 X-RAY EXAM CHEST 2 VIEWS: CPT

## 2025-05-30 PROCEDURE — 81003 URINALYSIS AUTO W/O SCOPE: CPT | Performed by: STUDENT IN AN ORGANIZED HEALTH CARE EDUCATION/TRAINING PROGRAM

## 2025-05-30 PROCEDURE — 83735 ASSAY OF MAGNESIUM: CPT | Performed by: STUDENT IN AN ORGANIZED HEALTH CARE EDUCATION/TRAINING PROGRAM

## 2025-05-30 RX ORDER — CALCIUM CARBONATE 500 MG/1
1000 TABLET, CHEWABLE ORAL 4 TIMES DAILY PRN
Status: DISCONTINUED | OUTPATIENT
Start: 2025-05-30 | End: 2025-06-02 | Stop reason: HOSPADM

## 2025-05-30 RX ORDER — ACETAMINOPHEN 325 MG/1
650 TABLET ORAL EVERY 4 HOURS PRN
Status: DISCONTINUED | OUTPATIENT
Start: 2025-05-30 | End: 2025-06-02 | Stop reason: HOSPADM

## 2025-05-30 RX ORDER — THERA TABS 400 MCG
1 TAB ORAL DAILY
Status: CANCELLED | OUTPATIENT
Start: 2025-05-30

## 2025-05-30 RX ORDER — FUROSEMIDE 10 MG/ML
80 INJECTION INTRAMUSCULAR; INTRAVENOUS ONCE
Status: COMPLETED | OUTPATIENT
Start: 2025-05-30 | End: 2025-05-30

## 2025-05-30 RX ORDER — AMOXICILLIN 250 MG
1 CAPSULE ORAL 2 TIMES DAILY PRN
Status: DISCONTINUED | OUTPATIENT
Start: 2025-05-30 | End: 2025-06-02 | Stop reason: HOSPADM

## 2025-05-30 RX ORDER — MAGNESIUM SULFATE 1 G/100ML
1 INJECTION INTRAVENOUS ONCE
Status: COMPLETED | OUTPATIENT
Start: 2025-05-30 | End: 2025-05-30

## 2025-05-30 RX ORDER — ASPIRIN 81 MG/1
81 TABLET, CHEWABLE ORAL DAILY
COMMUNITY

## 2025-05-30 RX ORDER — MAGNESIUM OXIDE 400 MG/1
400 TABLET ORAL DAILY
Status: CANCELLED | OUTPATIENT
Start: 2025-05-30

## 2025-05-30 RX ORDER — POTASSIUM CHLORIDE 1500 MG/1
20 TABLET, EXTENDED RELEASE ORAL DAILY
Status: CANCELLED | OUTPATIENT
Start: 2025-05-31

## 2025-05-30 RX ORDER — AMLODIPINE BESYLATE 2.5 MG/1
2.5 TABLET ORAL DAILY
Status: DISCONTINUED | OUTPATIENT
Start: 2025-05-31 | End: 2025-06-02 | Stop reason: HOSPADM

## 2025-05-30 RX ORDER — POTASSIUM CHLORIDE 20MEQ/15ML
20 LIQUID (ML) ORAL ONCE
Status: COMPLETED | OUTPATIENT
Start: 2025-05-30 | End: 2025-05-30

## 2025-05-30 RX ORDER — AMLODIPINE BESYLATE 2.5 MG/1
2.5 TABLET ORAL DAILY
Status: CANCELLED | OUTPATIENT
Start: 2025-05-30

## 2025-05-30 RX ORDER — LIDOCAINE 40 MG/G
CREAM TOPICAL
Status: DISCONTINUED | OUTPATIENT
Start: 2025-05-30 | End: 2025-06-02 | Stop reason: HOSPADM

## 2025-05-30 RX ORDER — FUROSEMIDE 10 MG/ML
80 INJECTION INTRAMUSCULAR; INTRAVENOUS ONCE
Status: COMPLETED | OUTPATIENT
Start: 2025-05-30 | End: 2025-05-31

## 2025-05-30 RX ORDER — ASPIRIN 81 MG/1
81 TABLET, CHEWABLE ORAL DAILY
Status: DISCONTINUED | OUTPATIENT
Start: 2025-05-31 | End: 2025-06-02 | Stop reason: HOSPADM

## 2025-05-30 RX ORDER — TRAZODONE HYDROCHLORIDE 100 MG/1
100 TABLET ORAL AT BEDTIME
Status: CANCELLED | OUTPATIENT
Start: 2025-05-30

## 2025-05-30 RX ORDER — AMOXICILLIN 250 MG
2 CAPSULE ORAL 2 TIMES DAILY PRN
Status: DISCONTINUED | OUTPATIENT
Start: 2025-05-30 | End: 2025-06-02 | Stop reason: HOSPADM

## 2025-05-30 RX ORDER — SILDENAFIL CITRATE 20 MG/1
20 TABLET ORAL 3 TIMES DAILY
Status: CANCELLED | OUTPATIENT
Start: 2025-05-30

## 2025-05-30 RX ORDER — POTASSIUM CHLORIDE 750 MG/1
20 CAPSULE, EXTENDED RELEASE ORAL DAILY
Status: DISCONTINUED | OUTPATIENT
Start: 2025-05-31 | End: 2025-06-02 | Stop reason: HOSPADM

## 2025-05-30 RX ORDER — GABAPENTIN 100 MG/1
100 CAPSULE ORAL AT BEDTIME
Status: DISCONTINUED | OUTPATIENT
Start: 2025-05-30 | End: 2025-06-02 | Stop reason: HOSPADM

## 2025-05-30 RX ORDER — TRAZODONE HYDROCHLORIDE 100 MG/1
100 TABLET ORAL AT BEDTIME
Status: DISCONTINUED | OUTPATIENT
Start: 2025-05-30 | End: 2025-06-02 | Stop reason: HOSPADM

## 2025-05-30 RX ORDER — THERA TABS 400 MCG
1 TAB ORAL DAILY
Status: DISCONTINUED | OUTPATIENT
Start: 2025-05-30 | End: 2025-06-02 | Stop reason: HOSPADM

## 2025-05-30 RX ORDER — SILDENAFIL CITRATE 20 MG/1
20 TABLET ORAL 3 TIMES DAILY
Status: DISCONTINUED | OUTPATIENT
Start: 2025-05-30 | End: 2025-06-02 | Stop reason: HOSPADM

## 2025-05-30 RX ORDER — MAGNESIUM OXIDE 400 MG/1
400 TABLET ORAL DAILY
Status: DISCONTINUED | OUTPATIENT
Start: 2025-05-31 | End: 2025-06-02 | Stop reason: HOSPADM

## 2025-05-30 RX ORDER — GABAPENTIN 100 MG/1
100 CAPSULE ORAL AT BEDTIME
Status: CANCELLED | OUTPATIENT
Start: 2025-05-30

## 2025-05-30 RX ADMIN — FUROSEMIDE 80 MG: 10 INJECTION, SOLUTION INTRAMUSCULAR; INTRAVENOUS at 15:24

## 2025-05-30 RX ADMIN — SILDENAFIL 20 MG: 20 TABLET ORAL at 20:06

## 2025-05-30 RX ADMIN — APIXABAN 5 MG: 5 TABLET, FILM COATED ORAL at 20:06

## 2025-05-30 RX ADMIN — POTASSIUM CHLORIDE 20 MEQ: 20 SOLUTION ORAL at 17:39

## 2025-05-30 RX ADMIN — THERA TABS 1 TABLET: TAB at 17:39

## 2025-05-30 RX ADMIN — GABAPENTIN 100 MG: 100 CAPSULE ORAL at 21:37

## 2025-05-30 RX ADMIN — TRAZODONE HYDROCHLORIDE 100 MG: 100 TABLET ORAL at 21:38

## 2025-05-30 RX ADMIN — MAGNESIUM SULFATE HEPTAHYDRATE 1 G: 1 INJECTION, SOLUTION INTRAVENOUS at 17:39

## 2025-05-30 ASSESSMENT — ACTIVITIES OF DAILY LIVING (ADL)
ADLS_ACUITY_SCORE: 41

## 2025-05-30 ASSESSMENT — COLUMBIA-SUICIDE SEVERITY RATING SCALE - C-SSRS
6. HAVE YOU EVER DONE ANYTHING, STARTED TO DO ANYTHING, OR PREPARED TO DO ANYTHING TO END YOUR LIFE?: NO
1. IN THE PAST MONTH, HAVE YOU WISHED YOU WERE DEAD OR WISHED YOU COULD GO TO SLEEP AND NOT WAKE UP?: NO
2. HAVE YOU ACTUALLY HAD ANY THOUGHTS OF KILLING YOURSELF IN THE PAST MONTH?: NO

## 2025-05-30 NOTE — PHARMACY-ADMISSION MEDICATION HISTORY
Pharmacist Admission Medication History    Admission medication history is complete. The information provided in this note is only as accurate as the sources available at the time of the update.    Information Source(s): Patient and CareEverywhere/SureScripts via in-person    Pertinent Information:   Patient was a god historian of their medications, confirmed all drug names, most dosages, frequencies, and when each was last taken.  Patient denied the use of any other prescription or over the counter medications.    Changes made to PTA medication list:  Added: None  Deleted: None  Changed:   Aspirin 81 daily -removed study in title, patient reports not taking for study  Trazodone changed to 100-150 mg qhs    Allergies reviewed with patient and updates made in EHR: yes    Medication History Completed By: Lio Pereira [facility] 5/30/2025 3:24 PM    PTA Med List   Medication Sig Last Dose/Taking    amLODIPine (NORVASC) 2.5 MG tablet Take 1 tablet (2.5 mg) by mouth daily. 5/30/2025 Morning    apixaban ANTICOAGULANT (ELIQUIS) 5 MG tablet Take 5 mg by mouth 2 times daily. 5/30/2025 Morning    aspirin (ASA) 81 MG chewable tablet Take 81 mg by mouth daily. 5/30/2025 Morning    calcium carbonate (TUMS) 500 MG chewable tablet Take 500 mg by mouth. Past Month    gabapentin (NEURONTIN) 100 MG capsule Take 100 mg by mouth at bedtime. 5/29/2025 Bedtime    magnesium oxide (MAG-OX) 400 MG tablet Take 1 tablet by mouth daily. 5/30/2025 Morning    multivitamin, therapeutic (THERA-VIT) TABS Take 1 tablet by mouth daily Past Month    potassium chloride ER (K-TAB) 20 MEQ CR tablet Take 1 tablet (20 mEq) by mouth daily at 2 pm. (Patient taking differently: Take 20 mEq by mouth daily.) 5/30/2025 Morning    sildenafil (REVATIO) 20 MG tablet Take 1 tablet (20 mg) by mouth 3 times daily. 5/30/2025 Noon    torsemide (DEMADEX) 20 MG tablet Take 2 tablets (40 mg) by mouth 2 times daily. 5/30/2025 Morning    traZODone (DESYREL) 50 MG tablet Take  100-150 mg by mouth at bedtime. 5/29/2025 Bedtime      65

## 2025-05-30 NOTE — H&P
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 RHC 9/2024: RA 10, PA 80/23 (44), wedge (9),  thermodilution CO 3.03, CI 2.3, PVR 11.5. Coming in w/ 20lb weight gain and volume overload. BNP 5196 on admission, higher than prior. On RA on admission, volume overloaded on admission exam. Inadequate response to uptitration of oral diuretics outpt. Overall, concerned she had ADHF 2/2 RV failure due to pulmonary HTN  - Cont PTA sildenafil 20mg TID  - Hold PTA torsemide 40mg BID. S/p IV Lasix 80mg in ED. Will monitor response and redoes as needed  - Troponin trend pending  - Echo  - I/Os, daily weights  - Cont PTA amlodipine  - Weight: Unclear EDW, but per pt 90lb. Weight on admission 104lb      #FABIOLA  B/l Cr ~0.6 (also frail, may not be fully representative). Cr on admission 1.21, suspected cardiorenal  - Diuresis as above  - Trend      #Recent PE RLL 5/3/25  - Cont PTA apixaban      #Mild hyperbilirubinemia   Noted as a chronic issue. Congestive hepatopathy may also play a role  - Trend      #Insomnia  - Cont PTA trazodone      #Peripheral neuropathy  - Cont PTA gabapentin       Diet: Fluid restriction 2000 ML FLUID  Combination Diet Low Saturated Fat Na <2400mg Diet, No Caffeine Diet  DVT Prophylaxis: DOAC  Bonner Catheter: Not present  Fluids: PRN  Central Lines: None  Cardiac Monitoring: ACTIVE order. Indication: Acute decompensated heart failure (48 hours)  Code Status: Full Code    Dispo: Disposition Plan       Clinically Significant  Risk Factors Present on Admission                # Drug Induced Coagulation Defect: home medication list includes an anticoagulant medication    # Drug Induced Platelet Defect: home medication list includes an antiplatelet medication             Patient seen and discussed with attending Dr Lou Merida, PGY3  Internal Medicine [facility]      _______________________________________________________    History of Present Illness     Ms Christina Novoa is a 85F w/ PMHx COPD associated pulmonary HTN, recent PE, 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 Recommended to come into hospital for IV diuresis. In the meantime, her home torsemide was increased from 40mg daily to BID. Her weight at that time was 105lb    She has h/o pulmonary HTN out of proportion to her cystic lung disease. IN the past workup included wedge pressure WNL, no echo evidence of L heart disease. V/Q scan was low probability (this was before her recent PE). Pos , speckled, 1:320, elevated RF, neg lupus AC panel, neg Hep B/C and HIV. Not candidate for lung transplant b/c age.     On arrival to ED she was tachycardic to 103, normotensive, on RA. Labs notable for BNP 5196, trop 26, Cr 1.21. CXR showed signs of mild edema vs reactive airway disease. Given Lasix 80mg IV in ED. Weight on admission 104lb (unclear if reported vs measured weight)    On our discussion she states she has been feeling more weight gain for the last month. Says her normal weight is 90lb but at home she got up to 108lb. Says the increased torsemide dose did not seem to make her weight go down. Says she still urinates at home but feels she is urinating less. Denies CP, N/V, diarrhea. She had some abdominal discomfort which she attributes to retaining more fluid in her belly, and feels her clothes  aren't fitting as well now. Endorses decreased appetite. She does not feel like her legs are more swollen. She does not feel more SOB currently, says she uses oxygen PRN. She says she has been using oxygen more at home recently, no because she feels more SOB, but b/c her son told her 'it wouldn't hurt to use the oxygen'. She states she is compliant with all her medications. No recent changes in diet        Review of Systems    As above in HPI, otherwise neg    Past Medical History    Past Medical History:   Diagnosis Date    Gastro-oesophageal reflux disease         Past Surgical History   History reviewed. No pertinent surgical history.     Social History   Social History     Tobacco Use    Smoking status: Never    Smokeless tobacco: Not on file   Substance Use Topics    Alcohol use: No       Family History       Prior to Admission Medications   Prescriptions Last Dose Informant Patient Reported? Taking?   TRAZODONE HCL PO   Yes No   Sig: Take 100 mg by mouth At Bedtime.   amLODIPine (NORVASC) 2.5 MG tablet   No No   Sig: Take 1 tablet (2.5 mg) by mouth daily.   apixaban ANTICOAGULANT (ELIQUIS) 5 MG tablet   Yes No   Sig: Take 5 mg by mouth 2 times daily.   calcium carbonate (TUMS) 500 MG chewable tablet   Yes No   Sig: Take 500 mg by mouth.   gabapentin (NEURONTIN) 100 MG capsule   Yes No   Sig: Take 100 mg by mouth at bedtime.   magnesium oxide (MAG-OX) 400 MG tablet   Yes No   Sig: Take 1 tablet by mouth daily.   multivitamin, therapeutic (THERA-VIT) TABS   Yes No   Sig: Take 1 tablet by mouth daily   potassium chloride ER (K-TAB) 20 MEQ CR tablet   No No   Sig: Take 1 tablet (20 mEq) by mouth daily at 2 pm.   sildenafil (REVATIO) 20 MG tablet   No No   Sig: Take 1 tablet (20 mg) by mouth 3 times daily.   study - aspirin, IDS# [id number], 81 mg tablet   Yes No   Sig: Take 1 tablet by mouth daily.   torsemide (DEMADEX) 20 MG tablet   No No   Sig: Take 2 tablets (40 mg) by mouth 2 times daily.   traZODone (DESYREL) 50  MG tablet   Yes No      Facility-Administered Medications: None         Allergies   Allergies   Allergen Reactions    Fluoxetine Hives    Valium          Vitals:  Temp: 97.3  F (36.3  C), Temp src: Oral, BP: 100/67, Pulse: 103, Resp: 16, SpO2: 93 %, O2 Device: None (Room air)  Weight: 104 lbs 1.6 oz      Physical Exam:   GEN: alert, comfortable, NAD  HEENT: EOMI,  anicteric sclera  CV: RRR, normal S1 S2, systolic murmur present  RESP: On RA, diminished BS in bases  ABDOMEN:  soft, nontender, nondistended, +BS  MSK: WWP. 2+ pitting edema in b/l LE  NEURO: Alert and oriented, moving all limbs spontaneously, mentation intact and speech normal  PSYCH: Appropriate mood and affect to match        Data     Recent Labs   Lab 05/30/25  1422   WBC 5.8   HGB 14.5   MCV 97            Radiology  Recent Results (from the past 24 hours)   Chest XR,  PA & LAT    Narrative    Chest 2 views    INDICATION: Shortness of breath. Bleeding. History of heart failure.  Pulmonary embolus with right heart strain.    COMPARISON: CT pulmonary angiogram 11/11/2012 compared to outside CT  angiogram chest 11/26/2023.    Heart is upper normal size. Areas of streaky and patchy opacities in  the lungs are mildly prominent suggestive of airway wall thickening  with perhaps some edema. Minimal costophrenic angle blunting  bilaterally on the PA view not so much on the lateral view. Left upper  abdominal surgical clips. No consolidation or pneumothorax or dominant  discrete pulmonary nodule.      Impression    IMPRESSION: Peribronchial cuffing which may indicate mild edema or  other reactive airway disease. Recent CT from November 2023 showed  multiple pulmonary cysts in the lower lobes not definitively visible  on this plain film series. There was some as a continuation suggestive  of small airway or small vessel disease.    CHRIS MAGDALENO MD         SYSTEM ID:  [id number]              Echocardiogram (5/4/2025):  Summary    1. Normal left  ventricular chamber size; normal ejection fraction (68% by 2-D biplane); normal wall motion. There is septal flattening present consistent with RV pressure/volume overload.     2. Severely enlarged right ventricular chamber size with moderately reduced systolic function. Estimated PA systolic pressure is 63mmHg, which may be underestimated due to degree of TR.     3. Moderate-severe tricuspid valve regurgitation.     4. Trivial pericardial effusion.     5. Enlarged inferior vena cava size with reduced inspiratory collapse (<50%) consistent with elevated central venous pressures.     6. Compared to the prior study from 8/21/24, the current study reveals the IVC is now dilated. No other substantial changes noted.      RHC (September 2024):  Her RA pressure was 10 mmHg, RV was 79 over 15 mmHg, PA was 80/23 with a mean of 44 mmHg, pulmonary capillary wedge pressure of 9, aortic saturation 96%, PA saturation of 58.8, thermodilution cardiac output of 3.03 L/min, index of 2.3 L/min/m , and PVR of 11.5 Wood units.  She had acute vasodilator testing with inhaled nitric oxide.  With inhaled nitric oxide her PA pressure dropped to 66/18 with a mean of 37, PA saturation was 79%, aortic saturation was 100%, thermodilution cardiac output of 3.3 with an index of 2.5, pulmonary capital wedge pressure of 12 mmHg and 7.7 Wood units.     PFT (September 2024)  Her pulmonary function test showed an FVC of 87% predicted, FEV1 of 84% predicted, FEV1 by FVC of 74% predicted, TLC of 72% predicted, and DLCO of 16% predicted.     V/Q scan (October 2024)  Low probability for chronic thromboembolic disease

## 2025-05-30 NOTE — ED TRIAGE NOTES
"  Triage Assessment & Note:    /67   Pulse 103   Temp 97.3  F (36.3  C) (Oral)   Resp 16   Ht 1.499 m (4' 11\")   Wt 47.2 kg (104 lb 1.6 oz)   SpO2 93%   BMI 21.03 kg/m        Patient presents with: PT comes via wc to triage with family with reports of being \"up in fluid\" and needing IV dieretics. No reports of fever, cough, CP, or travel.     Home Treatments/Remedies: Home medications    Febrile / Afebrile: afebrile    Duration of C/o: ongoing issues    Kristen Joyce RN  May 30, 2025          "

## 2025-05-30 NOTE — ED PROVIDER NOTES
ED Provider Note  [facility] Emergency Department      History     Chief Complaint   Patient presents with    Shortness of Breath     Fluid up     HPI  Christina Novoa is a 85 year old female, with past medical history of HFpEF, recent pulmonary embolism with cor pulmonale, pulmonary hypertension, who presents for evaluation of shortness of breath and fluid retention.  Patient reports that she has continuing to have shortness of breath and fluid is not coming off.  Has been slowly losing weight on high dose diuretics.  Patient's son reports that she was diagnosed with heart failure and pulmonary hypertension in January of this year, maximum weight was 107 pounds, had worsening shortness of breath difficulty breathing, dyspnea on exertion.  After starting her initially on diuretics, weight came down to 82 pounds and she was asymptomatic.  She has since continued to gain fluid, has been changed from Lasix to torsemide, torsemide has been increased now to 40 mg twice daily.  Earlier this month she was diagnosed with unexplained pulmonary embolism with right heart strain.  Has been compliant on Eliquis.  She is not having any acute exacerbation of her symptoms however she is failing outpatient management and spoke with her cardiologist who recommended she come to the emergency department for plan of admission and further management.  Patient does report that the diuretics make her urinate multiple times at night and every 1-2 hours, however she says that only a small amount of fluid seems to come out with each time, she denies any urinary symptoms.  She reports that she is having shortness of breath and dyspnea on exertion, no orthopnea.  She denies any chest pain.  No difficulty breathing at rest.    Independent historian: Patient's son provided history above.    Chart review: Reviewed telephone encounters from earlier today and yesterday.  Discussion about admitting the patient  "directly to the hospital, no beds were available, given limited bed availability and the patient's discomfort they discussed coming to the emergency department to start treatment and possibly warding in the emergency department till a bed is available.    Also reviewed hospital discharge summary from 5/5/2025.  Past medical history includes COPD, heart failure with preserved ejection fraction, pulmonary hypertension.  Presented for increasing shortness of breath.  Diagnosed with PE in the right lower lobe, felt to be mild to moderate in size with noted enlargement of the central pulmonary arteries.  Started on heparin initially and transition to Eliquis at discharge.  Lower extremity Dopplers no evidence of DVT.  O2 was weaned to as needed.  Prior to admission she was on 20 mg daily of torsemide.    Past Medical History  Past Medical History:   Diagnosis Date    Gastro-oesophageal reflux disease      History reviewed. No pertinent surgical history.  amLODIPine (NORVASC) 2.5 MG tablet  apixaban ANTICOAGULANT (ELIQUIS) 5 MG tablet  aspirin (ASA) 81 MG chewable tablet  calcium carbonate (TUMS) 500 MG chewable tablet  gabapentin (NEURONTIN) 100 MG capsule  magnesium oxide (MAG-OX) 400 MG tablet  multivitamin, therapeutic (THERA-VIT) TABS  potassium chloride ER (K-TAB) 20 MEQ CR tablet  sildenafil (REVATIO) 20 MG tablet  torsemide (DEMADEX) 20 MG tablet  traZODone (DESYREL) 50 MG tablet      Allergies   Allergen Reactions    Fluoxetine Hives    Valium      Family History  History reviewed. No pertinent family history.  Social History   Social History     Tobacco Use    Smoking status: Never   Substance Use Topics    Alcohol use: No    Drug use: No          Physical Exam   BP: 100/67  Pulse: 103  Temp: 97.3  F (36.3  C)  Resp: 16  Height: 149.9 cm (4' 11\")  Weight: 47.2 kg (104 lb 1.6 oz)  SpO2: 93 %  Physical Exam  Vitals and nursing note reviewed.   Constitutional:       General: She is not in acute distress.     " Appearance: Normal appearance. She is not ill-appearing, toxic-appearing or diaphoretic.   HENT:      Nose: Nose normal.      Mouth/Throat:      Mouth: Mucous membranes are moist.      Pharynx: Oropharynx is clear.   Eyes:      Extraocular Movements: Extraocular movements intact.      Pupils: Pupils are equal, round, and reactive to light.   Cardiovascular:      Rate and Rhythm: Regular rhythm. Tachycardia present.      Pulses: Normal pulses.      Heart sounds: Murmur heard.   Pulmonary:      Effort: Pulmonary effort is normal.      Breath sounds: Normal breath sounds.   Abdominal:      General: Bowel sounds are decreased. There is distension.      Palpations: Abdomen is soft. There is no fluid wave.      Comments: Superficial diffuse edema over the abdomen.  Consistent with third spacing.  No significant tenderness to palpation.  Bowel sounds decreased.   Musculoskeletal:         General: Normal range of motion.      Cervical back: Normal range of motion.      Right lower leg: Edema present.      Left lower leg: Edema present.      Comments: Lower extremity edema bilaterally worse in the thighs than the lower legs, 3+ pitting.   Skin:     General: Skin is warm and dry.   Neurological:      General: No focal deficit present.      Mental Status: She is alert and oriented to person, place, and time.   Psychiatric:         Mood and Affect: Mood normal.         Behavior: Behavior normal.           ED Course, Procedures, & Data      Procedures       A consult was attained from the cardiology service. The case was discussed with Dr. Blake from that service. .          Results for orders placed or performed during the hospital encounter of 05/30/25   Chest XR,  PA & LAT     Status: None    Narrative    Chest 2 views    INDICATION: Shortness of breath. Bleeding. History of heart failure.  Pulmonary embolus with right heart strain.    COMPARISON: CT pulmonary angiogram 11/11/2012 compared to outside CT  angiogram chest  11/26/2023.    Heart is upper normal size. Areas of streaky and patchy opacities in  the lungs are mildly prominent suggestive of airway wall thickening  with perhaps some edema. Minimal costophrenic angle blunting  bilaterally on the PA view not so much on the lateral view. Left upper  abdominal surgical clips. No consolidation or pneumothorax or dominant  discrete pulmonary nodule.      Impression    IMPRESSION: Peribronchial cuffing which may indicate mild edema or  other reactive airway disease. Recent CT from November 2023 showed  multiple pulmonary cysts in the lower lobes not definitively visible  on this plain film series. There was some as a continuation suggestive  of small airway or small vessel disease.    CHRIS MAGDALENO MD         SYSTEM ID:  [id number]   Basic metabolic panel     Status: Abnormal   Result Value Ref Range    Sodium 137 135 - 145 mmol/L    Potassium 3.5 3.4 - 5.3 mmol/L    Chloride 97 (L) 98 - 107 mmol/L    Carbon Dioxide (CO2) 26 22 - 29 mmol/L    Anion Gap 14 7 - 15 mmol/L    Urea Nitrogen 21.5 8.0 - 23.0 mg/dL    Creatinine 1.21 (H) 0.51 - 0.95 mg/dL    GFR Estimate 44 (L) >60 mL/min/1.73m2    Calcium 8.5 (L) 8.8 - 10.4 mg/dL    Glucose 126 (H) 70 - 99 mg/dL   NT-proBNP     Status: Abnormal   Result Value Ref Range    NT-proBNP 5,196 (H) 0 - 624 pg/mL   Troponin T, High Sensitivity     Status: Abnormal   Result Value Ref Range    Troponin T, High Sensitivity 26 (H) <=14 ng/L   UA with Microscopic reflex to Culture     Status: Abnormal    Specimen: Urine, Clean Catch   Result Value Ref Range    Color Urine Yellow Colorless, Straw, Light Yellow, Yellow    Appearance Urine Slightly Cloudy (A) Clear    Glucose Urine Negative Negative mg/dL    Bilirubin Urine Negative Negative    Ketones Urine Negative Negative mg/dL    Specific Gravity Urine 1.012 1.003 - 1.035    Blood Urine Small (A) Negative    pH Urine 6.5 5.0 - 7.0    Protein Albumin Urine Negative Negative mg/dL    Urobilinogen  Urine Normal Normal mg/dL    Nitrite Urine Positive (A) Negative    Leukocyte Esterase Urine Negative Negative    Bacteria Urine Moderate (A) None Seen /HPF    Mucus Urine Present (A) None Seen /LPF    RBC Urine 9 (H) <=2 /HPF    WBC Urine 1 <=5 /HPF    Squamous Epithelials Urine <1 <=1 /HPF    Hyaline Casts Urine 3 (H) <=2 /LPF    Narrative    Urine Culture ordered based on laboratory criteria   Magnesium     Status: Normal   Result Value Ref Range    Magnesium 1.8 1.7 - 2.3 mg/dL   [place] Draw     Status: None (In process)    Narrative    The following orders were created for panel order [place] Draw.  Procedure                               Abnormality         Status                     ---------                               -----------         ------                     Extra Blue Top Tube[[id number]]                             In process                 Extra Red Top Tube[[id number]]                              In process                   Please view results for these tests on the individual orders.   CBC with platelets and differential     Status: Abnormal   Result Value Ref Range    WBC Count 5.8 4.0 - 11.0 10e3/uL    RBC Count 4.73 3.80 - 5.20 10e6/uL    Hemoglobin 14.5 11.7 - 15.7 g/dL    Hematocrit 45.9 35.0 - 47.0 %    MCV 97 78 - 100 fL    MCH 30.7 26.5 - 33.0 pg    MCHC 31.6 31.5 - 36.5 g/dL    RDW 18.6 (H) 10.0 - 15.0 %    Platelet Count 192 150 - 450 10e3/uL    % Neutrophils 69 %    % Lymphocytes 15 %    % Monocytes 13 %    % Eosinophils 2 %    % Basophils 1 %    % Immature Granulocytes 1 %    NRBCs per 100 WBC 0 <1 /100    Absolute Neutrophils 4.0 1.6 - 8.3 10e3/uL    Absolute Lymphocytes 0.9 0.8 - 5.3 10e3/uL    Absolute Monocytes 0.7 0.0 - 1.3 10e3/uL    Absolute Eosinophils 0.1 0.0 - 0.7 10e3/uL    Absolute Basophils 0.1 0.0 - 0.2 10e3/uL    Absolute Immature Granulocytes 0.0 <=0.4 10e3/uL    Absolute NRBCs 0.0 10e3/uL   Hepatic panel     Status: Abnormal   Result Value Ref Range    Protein  Total 7.3 6.4 - 8.3 g/dL    Albumin 3.7 3.5 - 5.2 g/dL    Bilirubin Total 1.3 (H) <=1.2 mg/dL    Alkaline Phosphatase 92 40 - 150 U/L    AST 19 0 - 45 U/L    ALT <5 0 - 50 U/L    Bilirubin Direct 0.75 (H) 0.00 - 0.45 mg/dL   Troponin T, High Sensitivity     Status: Abnormal   Result Value Ref Range    Troponin T, High Sensitivity 24 (H) <=14 ng/L   Extra Tube     Status: None (In process)    Narrative    The following orders were created for panel order Extra Tube.  Procedure                               Abnormality         Status                     ---------                               -----------         ------                     Extra Purple Top Tube[[id number]]                           In process                   Please view results for these tests on the individual orders.   Potassium     Status: Normal   Result Value Ref Range    Potassium 3.7 3.4 - 5.3 mmol/L   Magnesium     Status: Normal   Result Value Ref Range    Magnesium 2.2 1.7 - 2.3 mg/dL   EKG 12 lead     Status: None   Result Value Ref Range    Systolic Blood Pressure  mmHg    Diastolic Blood Pressure  mmHg    Ventricular Rate 99 BPM    Atrial Rate 99 BPM    NM Interval 172 ms    QRS Duration 78 ms     ms    QTc 485 ms    P Axis 42 degrees    R AXIS 123 degrees    T Axis -18 degrees    Interpretation ECG       Sinus rhythm with sinus arrhythmia  Possible Right ventricular hypertrophy  Septal infarct , age undetermined  T wave abnormality, consider inferior ischemia  Abnormal ECG  Unconfirmed report - interpretation of this ECG is computer generated - see medical record for final interpretation  Confirmed by - EMERGENCY ROOM, PHYSICIAN ([id number]),  LAURA HERNANDEZ ([id number]) on 5/30/2025 3:34:45 PM     EKG 12-lead, tracing only     Status: None (Preliminary result)   Result Value Ref Range    Systolic Blood Pressure  mmHg    Diastolic Blood Pressure  mmHg    Ventricular Rate 91 BPM    Atrial Rate 91 BPM    NM Interval 196 ms    QRS  Duration 76 ms     ms    QTc 487 ms    P Axis 47 degrees    R AXIS 123 degrees    T Axis -22 degrees    Interpretation ECG       Sinus rhythm  Right axis deviation  Possible Right ventricular hypertrophy  Septal infarct , age undetermined  QTcB >= 480 msec  Abnormal ECG     CBC with platelets differential     Status: Abnormal    Narrative    The following orders were created for panel order CBC with platelets differential.  Procedure                               Abnormality         Status                     ---------                               -----------         ------                     CBC with platelets and ...[[id number]]  Abnormal            Final result                 Please view results for these tests on the individual orders.     Medications   lidocaine 1 % 0.1-1 mL (has no administration in time range)   lidocaine (LMX4) cream (has no administration in time range)   sodium chloride (PF) 0.9% PF flush 3 mL (3 mLs Intracatheter Not Given 5/31/25 3147)   sodium chloride (PF) 0.9% PF flush 3 mL (has no administration in time range)   senna-docusate (SENOKOT-S/PERICOLACE) 8.6-50 MG per tablet 1 tablet (has no administration in time range)     Or   senna-docusate (SENOKOT-S/PERICOLACE) 8.6-50 MG per tablet 2 tablet (has no administration in time range)   calcium carbonate (TUMS) chewable tablet 1,000 mg (has no administration in time range)   Patient is already receiving anticoagulation with heparin, enoxaparin (LOVENOX), warfarin (COUMADIN)  or other anticoagulant medication (has no administration in time range)   acetaminophen (TYLENOL) tablet 650 mg (has no administration in time range)     Or   acetaminophen (TYLENOL) Suppository 650 mg (has no administration in time range)   amLODIPine (NORVASC) tablet 2.5 mg (2.5 mg Oral $Given 5/31/25 3407)   aspirin (ASA) chewable tablet 81 mg (81 mg Oral $Given 5/31/25 8270)   gabapentin (NEURONTIN) capsule 100 mg (100 mg Oral $Given 5/30/25 9086)    magnesium oxide (MAG-OX) tablet 400 mg (400 mg Oral $Given 5/31/25 0743)   multivitamin, therapeutic (THERA-VIT) tablet 1 tablet (1 tablet Oral $Given 5/31/25 0743)   potassium chloride ER (MICRO-K) CR capsule 20 mEq (20 mEq Oral $Given 5/31/25 0742)   sildenafil (REVATIO) tablet 20 mg (20 mg Oral $Given 5/31/25 0742)   traZODone (DESYREL) tablet 100 mg (100 mg Oral $Given 5/30/25 2138)   apixaban ANTICOAGULANT (ELIQUIS) tablet 5 mg (5 mg Oral $Given 5/31/25 0743)   furosemide (LASIX) injection 80 mg (80 mg Intravenous $Given 5/30/25 1524)   potassium chloride (KAYCIEL) solution 20 mEq (20 mEq Oral $Given 5/30/25 1739)   magnesium sulfate 1 g in 100 mL D5W intermittent infusion (0 g Intravenous Stopped 5/30/25 1942)   furosemide (LASIX) injection 80 mg (80 mg Intravenous $Given 5/31/25 0013)     Labs Ordered and Resulted from Time of ED Arrival to Time of ED Departure   BASIC METABOLIC PANEL - Abnormal       Result Value    Sodium 137      Potassium 3.5      Chloride 97 (*)     Carbon Dioxide (CO2) 26      Anion Gap 14      Urea Nitrogen 21.5      Creatinine 1.21 (*)     GFR Estimate 44 (*)     Calcium 8.5 (*)     Glucose 126 (*)    NT-PROBNP - Abnormal    NT-proBNP 5,196 (*)    TROPONIN T, HIGH SENSITIVITY - Abnormal    Troponin T, High Sensitivity 26 (*)    ROUTINE UA WITH MICROSCOPIC REFLEX TO CULTURE - Abnormal    Color Urine Yellow      Appearance Urine Slightly Cloudy (*)     Glucose Urine Negative      Bilirubin Urine Negative      Ketones Urine Negative      Specific Gravity Urine 1.012      Blood Urine Small (*)     pH Urine 6.5      Protein Albumin Urine Negative      Urobilinogen Urine Normal      Nitrite Urine Positive (*)     Leukocyte Esterase Urine Negative      Bacteria Urine Moderate (*)     Mucus Urine Present (*)     RBC Urine 9 (*)     WBC Urine 1      Squamous Epithelials Urine <1      Hyaline Casts Urine 3 (*)    CBC WITH PLATELETS AND DIFFERENTIAL - Abnormal    WBC Count 5.8      RBC Count  4.73      Hemoglobin 14.5      Hematocrit 45.9      MCV 97      MCH 30.7      MCHC 31.6      RDW 18.6 (*)     Platelet Count 192      % Neutrophils 69      % Lymphocytes 15      % Monocytes 13      % Eosinophils 2      % Basophils 1      % Immature Granulocytes 1      NRBCs per 100 WBC 0      Absolute Neutrophils 4.0      Absolute Lymphocytes 0.9      Absolute Monocytes 0.7      Absolute Eosinophils 0.1      Absolute Basophils 0.1      Absolute Immature Granulocytes 0.0      Absolute NRBCs 0.0     HEPATIC FUNCTION PANEL - Abnormal    Protein Total 7.3      Albumin 3.7      Bilirubin Total 1.3 (*)     Alkaline Phosphatase 92      AST 19      ALT <5      Bilirubin Direct 0.75 (*)    TROPONIN T, HIGH SENSITIVITY - Abnormal    Troponin T, High Sensitivity 24 (*)    MAGNESIUM - Normal    Magnesium 1.8     POTASSIUM - Normal    Potassium 3.7     MAGNESIUM - Normal    Magnesium 2.2     COMPREHENSIVE METABOLIC PANEL   CBC WITH PLATELETS   MAGNESIUM   URINE CULTURE     Chest XR,  PA & LAT   Final Result   IMPRESSION: Peribronchial cuffing which may indicate mild edema or   other reactive airway disease. Recent CT from November 2023 showed   multiple pulmonary cysts in the lower lobes not definitively visible   on this plain film series. There was some as a continuation suggestive   of small airway or small vessel disease.      CHRIS MAGDALENO MD            SYSTEM ID:  [id number]      Echo Complete    (Results Pending)          Critical care was not performed.     Medical Decision Making  The patient's presentation was of high complexity (a chronic illness severe exacerbation, progression, or side effect of treatment).    The patient's evaluation involved:  an assessment requiring an independent historian (see separate area of note for details)  review of external note(s) from 3+ sources (see separate area of note for details)  ordering and/or review of 3+ test(s) in this encounter (see separate area of note for  details)  independent interpretation of testing performed by another health professional (see separate area of note for details)    The patient's management necessitated high risk (drug therapy requiring intensive monitoring (see separate area of note for details)) and high risk (a decision regarding hospitalization).    Independent interpretation: Chest x-ray on my interpretation shows no focal consolidation consistent with a focal pneumonia, cardiomegaly, no significant pulmonary effusion.  Diffuse opacities suggestive of pulmonary edema.  Compared to chest x-ray from June 2024, heart appears similarly in size.  EKG: EKG shows a sinus tachycardia with sinus arrhythmia.    Assessment & Plan    Christina Novoa is a 85 year old female who presents to the emergency department for evaluation of shortness of breath and fluid retention.  Upon presentation patient is alert, speaking in short sentences, nontoxic non-ill-appearing, afebrile, skin warm pink and dry.  Broad differential was considered including acute on chronic heart failure, ACS, acute kidney injury.    Patient's history and physical exam concerning for acute on chronic heart failure.  While she is not gaining weight she has significant peripheral edema all the way through her abdomen.  Workup included a metabolic panel that showed a creatinine of 1.21.  No recent creatinine for comparison of her baseline.  Calcium 8.5, chloride 97.  Direct bilirubin mildly elevated 0.75, total bilirubin elevated at 1.3.  Glucose normal.  EKG does not show signs of acute ischemia however her high sensitivity troponin mildly elevated at 26, BNP over 5100.  CBC did not show any leukocytosis, no acute anemia, no abnormal platelet count.  Chest x-ray showed cardiomegaly that appears similar to her previous x-rays, evidence of pulmonary congestion, no focal consolidation.  No pleural effusions.    Patient was recommended to come to the emergency department following conversations  with her cardiologist for inability to get her peripheral edema and worsening heart failure controlled as an outpatient with oral medication.  Despite multiple increases of her torsemide she continues to not lose weight and have lower extremity edema and worsening shortness of breath and difficulty breathing.  She does not have any orthopnea and heart failure appears to be right-sided.  This is complicated by potential right heart strain secondary to moderate PE recently diagnosed.  per family dry weight approximately 82 pounds.  Cardiology was consulted and they evaluated the patient, they agreed to admit the patient to cardiology 2, recommended starting patient on 80 mg IV Lasix here in the emergency department.  Did consider possibility of urinary retention.  Prevoid bladder scan showed 168 mL, patient was able to void 175 mL.  Patient comfortable with plan of admission, all questions were answered to the best my ability.  Postvoid bladder scans pending.    I have reviewed the nursing notes. I have reviewed the findings, diagnosis, plan and need for follow up with the patient.    New Prescriptions    No medications on file       Final diagnoses:   Acute on chronic right-sided congestive heart failure (H)       Porter Grewal PA-C, CAQ-EM    [facility] EMERGENCY DEPARTMENT  5/30/2025     Porter Grewal PA-C  05/30/25 1714       Porter Grewal PA-C  05/30/25 9652    --    ED Attending Physician Attestation    I Troy Hickman MD, cared for this patient with the Advanced Practice Provider ([facility]). I personally provided a substantive portion of the care for this patient, including approving the care plan for the number and complexity of problems addressed and taking responsibility related to the risk of complications and/or morbidity or mortality of patient management. Please see the [facility]'s documentation for full details.    Patient seen by cardiology staff and agree to admit and manage patient on  their service.  Patient eval by myself also and agree.          Troy Hickman MD  Emergency Medicine     This note was created at least in part by the use of dragon voice dictation system. Inadvertent typographical errors may still exist.  Troy Hickman MD.  Patient evaluated in the emergency department during the COVID-19 pandemic period. Careful attention to patients safety was addressed throughout the evaluation. Evaluation and treatment management was initiated with disposition made efficiently and appropriate as possible to minimize any risk of potential exposure to patient during this evaluation.           Troy Hickman MD  05/31/25 0927

## 2025-05-30 NOTE — H&P
`  I personally saw and examined this patient by myself, discussed case with patient and with son who is a paramedic.      Impression:  1 Acute and chronic right heart failure  2. Advance lung disease  3. Pulmonary hypertension  4. CKD 3      Plan:  Parenteral diuretics  Oxygen supplementation  Monitor oxygenation with sildenafil      85 year old female seen and admitted for 20# weight gain, decreased exercise tolerance, secondary to decompensate RHF.      Allergies fluxoixitene   : live by self,     Smoke: none      Constitutional: alert, oriented, normal gait and station, normal mentation.  Oral: moist mucous membranes  Lymph: without pathologic adenopathy  Chest: basilar dullness   Cor: No evidence of left or right ventricular activity.  Rhythm is regular.  S1 normal, S2 split physiologically.   Abdomen: without tenderness, rebound, guarding, masses, 4+ ascites  Extremities: Edema 3+  Neuro: no focal defects, normal mentation  Skin: without open lesions  Psych: oriented, verbal, mental status in tact     Wt Readings from Last 24 Encounters:   05/30/25 47.2 kg (104 lb 1.6 oz)   05/27/25 47.9 kg (105 lb 9.6 oz)   12/17/24 46.3 kg (102 lb)   11/26/15 48.6 kg (107 lb 2 oz)   11/11/12 49.7 kg (109 lb 9.6 oz)       Latest Reference Range & Units 05/30/25 14:22   Sodium 135 - 145 mmol/L 137   Potassium 3.4 - 5.3 mmol/L 3.5   Chloride 98 - 107 mmol/L 97 (L)   Carbon Dioxide (CO2) 22 - 29 mmol/L 26   Urea Nitrogen 8.0 - 23.0 mg/dL 21.5   Creatinine 0.51 - 0.95 mg/dL 1.21 (H)   GFR Estimate >60 mL/min/1.73m2 44 (L)   Calcium 8.8 - 10.4 mg/dL 8.5 (L)   Anion Gap 7 - 15 mmol/L 14   Magnesium 1.7 - 2.3 mg/dL 1.8   Albumin 3.5 - 5.2 g/dL 3.7   Protein Total 6.4 - 8.3 g/dL 7.3   Alkaline Phosphatase 40 - 150 U/L 92   ALT 0 - 50 U/L <5   AST 0 - 45 U/L 19   Bilirubin Direct 0.00 - 0.45 mg/dL 0.75 (H)   Bilirubin Total <=1.2 mg/dL 1.3 (H)   Glucose 70 - 99 mg/dL 126 (H)   N-Terminal Pro Bnp 0 - 624 pg/mL 5,196 (H)   Troponin T,  High Sensitivity <=14 ng/L 26 (H)   WBC 4.0 - 11.0 10e3/uL 5.8   Hemoglobin 11.7 - 15.7 g/dL 14.5   Hematocrit 35.0 - 47.0 % 45.9   Platelet Count 150 - 450 10e3/uL 192   RBC Count 3.80 - 5.20 10e6/uL 4.73   MCV 78 - 100 fL 97   MCH 26.5 - 33.0 pg 30.7   MCHC 31.5 - 36.5 g/dL 31.6   RDW 10.0 - 15.0 % 18.6 (H)   % Neutrophils % 69   % Lymphocytes % 15   % Monocytes % 13   % Eosinophils % 2   % Basophils % 1   % Immature Granulocytes % 1   NRBC/W <1 /100 0   Absolute Neutrophil 1.6 - 8.3 10e3/uL 4.0   Absolute Lymphocytes 0.8 - 5.3 10e3/uL 0.9   Absolute Monocytes 0.0 - 1.3 10e3/uL 0.7   Absolute Eosinophils 0.0 - 0.7 10e3/uL 0.1   Absolute Basophils 0.0 - 0.2 10e3/uL 0.1   Absolute Immature Granulocytes <=0.4 10e3/uL 0.0   Absolute NRBCs 10e3/uL 0.0     Current Facility-Administered Medications   Medication Dose Route Frequency Provider Last Rate Last Admin    acetaminophen (TYLENOL) tablet 650 mg  650 mg Oral Q4H PRN Mauro Merida MD        Or    acetaminophen (TYLENOL) Suppository 650 mg  650 mg Rectal Q4H PRN Mauro Merida MD        [START ON 5/31/2025] amLODIPine (NORVASC) tablet 2.5 mg  2.5 mg Oral Daily Mauro Merida MD        apixaban ANTICOAGULANT (ELIQUIS) tablet 5 mg  5 mg Oral BID Mauro Merida MD        [START ON 5/31/2025] aspirin (ASA) chewable tablet 81 mg  81 mg Oral Daily Mauro Merida MD        calcium carbonate (TUMS) chewable tablet 1,000 mg  1,000 mg Oral 4x Daily PRN Mauro Merida MD        gabapentin (NEURONTIN) capsule 100 mg  100 mg Oral At Bedtime Mauro Merida MD        lidocaine (LMX4) cream   Topical Q1H PRN Mauro Merida MD        lidocaine 1 % 0.1-1 mL  0.1-1 mL Other Q1H PRN Mauro Merida MD        [START ON 5/31/2025] magnesium oxide (MAG-OX) tablet 400 mg  400 mg Oral Daily Mauro Merida MD        magnesium sulfate 1 g in 100 mL D5W intermittent infusion  1 g Intravenous Once Mauro Merida MD        multivitamin, therapeutic (THERA-VIT) tablet 1  tablet  1 tablet Oral Daily Mauro Merida MD        Patient is already receiving anticoagulation with heparin, enoxaparin (LOVENOX), warfarin (COUMADIN)  or other anticoagulant medication   Does not apply Continuous PRN Mauro Merida MD        potassium chloride (KAYCIEL) solution 20 mEq  20 mEq Oral Once Mauro Merida MD        [START ON 5/31/2025] potassium chloride ER (MICRO-K) CR capsule 20 mEq  20 mEq Oral Daily Mauro Merida MD        senchet-docusate (SENOKOT-S/PERICOLACE) 8.6-50 MG per tablet 1 tablet  1 tablet Oral BID PRN Mauro Merida MD        Or    senfayedocusate (SENOKOT-S/PERICOLACE) 8.6-50 MG per tablet 2 tablet  2 tablet Oral BID PRN Mauro Merida MD        sildenafil (REVATIO) tablet 20 mg  20 mg Oral TID Mauro Merida MD        sodium chloride (PF) 0.9% PF flush 3 mL  3 mL Intracatheter Q8H CARLO Mauro Merida MD   3 mL at 05/30/25 1523    sodium chloride (PF) 0.9% PF flush 3 mL  3 mL Intracatheter q1 min prn Mauro Merida MD        traZODone (DESYREL) tablet 100 mg  100 mg Oral At Bedtime Mauro Merida MD         Current Outpatient Medications   Medication Sig Dispense Refill    amLODIPine (NORVASC) 2.5 MG tablet Take 1 tablet (2.5 mg) by mouth daily. 90 tablet 1    apixaban ANTICOAGULANT (ELIQUIS) 5 MG tablet Take 5 mg by mouth 2 times daily.      aspirin (ASA) 81 MG chewable tablet Take 81 mg by mouth daily.      calcium carbonate (TUMS) 500 MG chewable tablet Take 500 mg by mouth.      gabapentin (NEURONTIN) 100 MG capsule Take 100 mg by mouth at bedtime.      magnesium oxide (MAG-OX) 400 MG tablet Take 1 tablet by mouth daily.      multivitamin, therapeutic (THERA-VIT) TABS Take 1 tablet by mouth daily      potassium chloride ER (K-TAB) 20 MEQ CR tablet Take 1 tablet (20 mEq) by mouth daily at 2 pm. (Patient taking differently: Take 20 mEq by mouth daily.) 90 tablet 1    sildenafil (REVATIO) 20 MG tablet Take 1 tablet (20 mg) by mouth 3 times daily. 90 tablet 9     torsemide (DEMADEX) 20 MG tablet Take 2 tablets (40 mg) by mouth 2 times daily. 360 tablet 3    traZODone (DESYREL) 50 MG tablet Take 100-150 mg by mouth at bedtime.

## 2025-05-31 ENCOUNTER — APPOINTMENT (OUTPATIENT)
Dept: CARDIOLOGY | Facility: CLINIC | Age: 85
End: 2025-05-31
Payer: COMMERCIAL

## 2025-05-31 LAB
ALBUMIN SERPL BCG-MCNC: 3.4 G/DL (ref 3.5–5.2)
ALP SERPL-CCNC: 79 U/L (ref 40–150)
ALT SERPL W P-5'-P-CCNC: <5 U/L (ref 0–50)
ANION GAP SERPL CALCULATED.3IONS-SCNC: 11 MMOL/L (ref 7–15)
AST SERPL W P-5'-P-CCNC: 17 U/L (ref 0–45)
BACTERIA UR CULT: ABNORMAL
BILIRUB SERPL-MCNC: 1.5 MG/DL
BUN SERPL-MCNC: 17 MG/DL (ref 8–23)
CALCIUM SERPL-MCNC: 8.1 MG/DL (ref 8.8–10.4)
CHLORIDE SERPL-SCNC: 97 MMOL/L (ref 98–107)
CREAT SERPL-MCNC: 1.08 MG/DL (ref 0.51–0.95)
EGFRCR SERPLBLD CKD-EPI 2021: 50 ML/MIN/1.73M2
ERYTHROCYTE [DISTWIDTH] IN BLOOD BY AUTOMATED COUNT: 18.6 % (ref 10–15)
GLUCOSE SERPL-MCNC: 93 MG/DL (ref 70–99)
HCO3 SERPL-SCNC: 29 MMOL/L (ref 22–29)
HCT VFR BLD AUTO: 42.6 % (ref 35–47)
HGB BLD-MCNC: 13.7 G/DL (ref 11.7–15.7)
LVEF ECHO: NORMAL
MAGNESIUM SERPL-MCNC: 1.8 MG/DL (ref 1.7–2.3)
MCH RBC QN AUTO: 30.9 PG (ref 26.5–33)
MCHC RBC AUTO-ENTMCNC: 32.2 G/DL (ref 31.5–36.5)
MCV RBC AUTO: 96 FL (ref 78–100)
PLATELET # BLD AUTO: 170 10E3/UL (ref 150–450)
POTASSIUM SERPL-SCNC: 3.2 MMOL/L (ref 3.4–5.3)
POTASSIUM SERPL-SCNC: 4 MMOL/L (ref 3.4–5.3)
PROT SERPL-MCNC: 6.5 G/DL (ref 6.4–8.3)
RBC # BLD AUTO: 4.43 10E6/UL (ref 3.8–5.2)
SODIUM SERPL-SCNC: 137 MMOL/L (ref 135–145)
WBC # BLD AUTO: 4.9 10E3/UL (ref 4–11)

## 2025-05-31 PROCEDURE — 93306 TTE W/DOPPLER COMPLETE: CPT | Mod: 26 | Performed by: INTERNAL MEDICINE

## 2025-05-31 PROCEDURE — 85048 AUTOMATED LEUKOCYTE COUNT: CPT

## 2025-05-31 PROCEDURE — 250N000011 HC RX IP 250 OP 636

## 2025-05-31 PROCEDURE — 84132 ASSAY OF SERUM POTASSIUM: CPT

## 2025-05-31 PROCEDURE — 36415 COLL VENOUS BLD VENIPUNCTURE: CPT

## 2025-05-31 PROCEDURE — 83735 ASSAY OF MAGNESIUM: CPT

## 2025-05-31 PROCEDURE — 250N000013 HC RX MED GY IP 250 OP 250 PS 637

## 2025-05-31 PROCEDURE — 120N000002 HC R&B MED SURG/OB UMMC

## 2025-05-31 PROCEDURE — 82310 ASSAY OF CALCIUM: CPT

## 2025-05-31 PROCEDURE — 93306 TTE W/DOPPLER COMPLETE: CPT

## 2025-05-31 PROCEDURE — 82435 ASSAY OF BLOOD CHLORIDE: CPT

## 2025-05-31 PROCEDURE — 85041 AUTOMATED RBC COUNT: CPT

## 2025-05-31 PROCEDURE — 99231 SBSQ HOSP IP/OBS SF/LOW 25: CPT | Mod: 25 | Performed by: INTERNAL MEDICINE

## 2025-05-31 RX ORDER — FUROSEMIDE 10 MG/ML
80 INJECTION INTRAMUSCULAR; INTRAVENOUS ONCE
Status: COMPLETED | OUTPATIENT
Start: 2025-05-31 | End: 2025-05-31

## 2025-05-31 RX ORDER — POTASSIUM CHLORIDE 20MEQ/15ML
40 LIQUID (ML) ORAL ONCE
Status: DISCONTINUED | OUTPATIENT
Start: 2025-05-31 | End: 2025-05-31

## 2025-05-31 RX ORDER — POTASSIUM CHLORIDE 750 MG/1
40 TABLET, EXTENDED RELEASE ORAL ONCE
Status: COMPLETED | OUTPATIENT
Start: 2025-05-31 | End: 2025-05-31

## 2025-05-31 RX ORDER — POTASSIUM CHLORIDE 750 MG/1
20 TABLET, EXTENDED RELEASE ORAL ONCE
Status: DISCONTINUED | OUTPATIENT
Start: 2025-05-31 | End: 2025-05-31

## 2025-05-31 RX ORDER — MAGNESIUM SULFATE HEPTAHYDRATE 40 MG/ML
2 INJECTION, SOLUTION INTRAVENOUS ONCE
Status: COMPLETED | OUTPATIENT
Start: 2025-05-31 | End: 2025-05-31

## 2025-05-31 RX ADMIN — MAGNESIUM SULFATE HEPTAHYDRATE 2 G: 2 INJECTION, SOLUTION INTRAVENOUS at 22:18

## 2025-05-31 RX ADMIN — ASPIRIN 81 MG CHEWABLE TABLET 81 MG: 81 TABLET CHEWABLE at 07:42

## 2025-05-31 RX ADMIN — THERA TABS 1 TABLET: TAB at 07:43

## 2025-05-31 RX ADMIN — POTASSIUM CHLORIDE 40 MEQ: 750 TABLET, EXTENDED RELEASE ORAL at 10:53

## 2025-05-31 RX ADMIN — SILDENAFIL 20 MG: 20 TABLET ORAL at 13:57

## 2025-05-31 RX ADMIN — APIXABAN 5 MG: 5 TABLET, FILM COATED ORAL at 20:48

## 2025-05-31 RX ADMIN — SILDENAFIL 20 MG: 20 TABLET ORAL at 20:48

## 2025-05-31 RX ADMIN — MAGNESIUM OXIDE TAB 400 MG (241.3 MG ELEMENTAL MG) 400 MG: 400 (241.3 MG) TAB at 07:43

## 2025-05-31 RX ADMIN — AMLODIPINE BESYLATE 2.5 MG: 2.5 TABLET ORAL at 07:43

## 2025-05-31 RX ADMIN — FUROSEMIDE 80 MG: 10 INJECTION, SOLUTION INTRAMUSCULAR; INTRAVENOUS at 00:13

## 2025-05-31 RX ADMIN — POTASSIUM CHLORIDE 20 MEQ: 750 CAPSULE, EXTENDED RELEASE ORAL at 07:42

## 2025-05-31 RX ADMIN — FUROSEMIDE 80 MG: 10 INJECTION, SOLUTION INTRAMUSCULAR; INTRAVENOUS at 13:57

## 2025-05-31 RX ADMIN — APIXABAN 5 MG: 5 TABLET, FILM COATED ORAL at 07:43

## 2025-05-31 RX ADMIN — SILDENAFIL 20 MG: 20 TABLET ORAL at 07:42

## 2025-05-31 RX ADMIN — TRAZODONE HYDROCHLORIDE 100 MG: 100 TABLET ORAL at 22:18

## 2025-05-31 RX ADMIN — GABAPENTIN 100 MG: 100 CAPSULE ORAL at 22:18

## 2025-05-31 ASSESSMENT — ACTIVITIES OF DAILY LIVING (ADL)
ADLS_ACUITY_SCORE: 41

## 2025-05-31 NOTE — PROGRESS NOTES
[facility]    I personally saw and examined patient with resident and agree with assessment and plan.    Wt Readings from Last 24 Encounters:   05/31/25 44.8 kg (98 lb 11.2 oz)   05/27/25 47.9 kg (105 lb 9.6 oz)   12/17/24 46.3 kg (102 lb)   11/26/15 48.6 kg (107 lb 2 oz)   11/11/12 49.7 kg (109 lb 9.6 oz)         Latest Reference Range & Units 05/30/25 14:22 05/30/25 16:24 05/30/25 20:44 05/31/25 09:21   Sodium 135 - 145 mmol/L 137   137   Potassium 3.4 - 5.3 mmol/L 3.5  3.7 3.2 (L)   Chloride 98 - 107 mmol/L 97 (L)   97 (L)   Carbon Dioxide (CO2) 22 - 29 mmol/L 26   29   Urea Nitrogen 8.0 - 23.0 mg/dL 21.5   17.0   Creatinine 0.51 - 0.95 mg/dL 1.21 (H)   1.08 (H)   GFR Estimate >60 mL/min/1.73m2 44 (L)   50 (L)   Calcium 8.8 - 10.4 mg/dL 8.5 (L)   8.1 (L)   Anion Gap 7 - 15 mmol/L 14   11   Magnesium 1.7 - 2.3 mg/dL 1.8  2.2 1.8   Albumin 3.5 - 5.2 g/dL 3.7   3.4 (L)   Protein Total 6.4 - 8.3 g/dL 7.3   6.5   Alkaline Phosphatase 40 - 150 U/L 92   79   ALT 0 - 50 U/L <5   <5   AST 0 - 45 U/L 19   17   Bilirubin Direct 0.00 - 0.45 mg/dL 0.75 (H)      Bilirubin Total <=1.2 mg/dL 1.3 (H)   1.5 (H)   Glucose 70 - 99 mg/dL 126 (H)   93   N-Terminal Pro Bnp 0 - 624 pg/mL 5,196 (H)      Troponin T, High Sensitivity <=14 ng/L 26 (H) 24 (H)     WBC 4.0 - 11.0 10e3/uL 5.8   4.9   Hemoglobin 11.7 - 15.7 g/dL 14.5   13.7   Hematocrit 35.0 - 47.0 % 45.9   42.6   Platelet Count 150 - 450 10e3/uL 192   170   RBC Count 3.80 - 5.20 10e6/uL 4.73   4.43   MCV 78 - 100 fL 97   96   MCH 26.5 - 33.0 pg 30.7   30.9   MCHC 31.5 - 36.5 g/dL 31.6   32.2   RDW 10.0 - 15.0 % 18.6 (H)   18.6 (H)   % Neutrophils % 69      % Lymphocytes % 15      % Monocytes % 13      % Eosinophils % 2      % Basophils % 1          Progress Note - Cardiology 2       Date of Admission:  5/30/2025    Assessment & Plan   Ms Christina Novoa is a 85F w/ PMHx COPD associated pulmonary HTN, recent PE, 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 RHC 9/2024: RA 10, PA 80/23 (44), wedge (9),  thermodilution CO 3.03, CI 2.3, PVR 11.5. Coming in w/ 20lb weight gain and volume overload. BNP 5196 on admission, higher than prior. On RA on admission, volume overloaded on admission exam. Inadequate response to uptitration of oral diuretics outpt. Overall, concerned she had ADHF 2/2 RV failure due to pulmonary HTN  - Cont PTA sildenafil 20mg TID  - Hold PTA torsemide 40mg BID. Redose IV lasix 80mg IV as needed, appears to have had good response  - Echo pending  - I/Os, daily weights  - Cont PTA amlodipine  - Weight: Unclear EDW, but per pt 90lb. Weight on admission 104lb, currently 98lb        #FABIOLA  B/l Cr ~0.6 (also frail, may not be fully representative). Cr on admission 1.21, suspected cardiorenal  - Diuresis as above, Cr improving with diuresis  - Trend        #Recent PE RLL 5/3/25  - Cont PTA apixaban        #Mild hyperbilirubinemia   Noted as a chronic issue. Congestive hepatopathy may also play a role  - Trend        #Insomnia  - Cont PTA trazodone        #Peripheral neuropathy  - Cont PTA gabapentin         Diet: Fluid restriction 2000 ML FLUID  Combination Diet Low Saturated Fat Na <2400mg Diet, No Caffeine Diet  DVT Prophylaxis: DOAC  Bonner Catheter: Not present  Fluids: PRN  Central Lines: None  Cardiac Monitoring: ACTIVE order. Indication: Acute decompensated heart failure (48 hours)  Code Status: Full Code    Dispo: Disposition Plan        Clinically Significant Risk Factors Present on Admission         # Hypochloremia: Lowest Cl = 97 mmol/L in last 2 days, will monitor as appropriate  #  Hypocalcemia: Lowest Ca = 8.5 mg/dL in last 2 days, will monitor and replace as appropriate       # Drug Induced Coagulation Defect: home medication list includes an anticoagulant medication    # Drug Induced Platelet Defect: home medication list includes an antiplatelet medication                Patient seen and discussed with attending Shreyas Blake, PGY3  Internal Medicine [facility]      Clinically Significant Risk Factors Present on Admission                         _____________________________________________________________    Interval History     NAEO. She says she got up to urinate 5 times in the middle of the night after second dose of lasix. Says she feels better today, feels her abdomen is less tight, denies SOB, CP, N/V. She overall feels better today compared to yesterday and has no new complaints      Vitals:  Temp: 97.5  F (36.4  C), Temp src: Oral, BP: 107/65, Pulse: 90, Resp: 16, SpO2: 98 %, O2 Device: Nasal cannula  Weight: 98 lbs 11.2 oz      Physical Exam:   GEN: alert, comfortable, NAD  HEENT: EOMI,  anicteric sclera  CV: RRR, normal S1 S2, systolic murmur present  RESP: On RA, diminished BS in bases  ABDOMEN:  soft, nontender, nondistended, +BS  MSK: WWP. 1+ pitting edema in b/l LE  NEURO: Alert and oriented, moving all limbs spontaneously, mentation intact and speech normal  PSYCH: Appropriate mood and affect to match          Data     Recent Labs   Lab 05/30/25 2044 05/30/25  1422   WBC  --  5.8   HGB  --  14.5   MCV  --  97   PLT  --  192   NA  --  137   POTASSIUM 3.7 3.5   CHLORIDE  --  97*   CO2  --  26   BUN  --  21.5   CR  --  1.21*   ANIONGAP  --  14   JOSE DE JESUS  --  8.5*   GLC  --  126*   ALBUMIN  --  3.7   PROTTOTAL  --  7.3   BILITOTAL  --  1.3*   ALKPHOS  --  92   ALT  --  <5   AST  --  19         Radiology  Recent Results (from the past 24 hours)   Chest XR,  PA & LAT    Narrative    Chest 2 views    INDICATION: Shortness of breath. Bleeding.  History of heart failure.  Pulmonary embolus with right heart strain.    COMPARISON: CT pulmonary angiogram 11/11/2012 compared to outside CT  angiogram chest 11/26/2023.    Heart is upper normal size. Areas of streaky and patchy opacities in  the lungs are mildly prominent suggestive of airway wall thickening  with perhaps some edema. Minimal costophrenic angle blunting  bilaterally on the PA view not so much on the lateral view. Left upper  abdominal surgical clips. No consolidation or pneumothorax or dominant  discrete pulmonary nodule.      Impression    IMPRESSION: Peribronchial cuffing which may indicate mild edema or  other reactive airway disease. Recent CT from November 2023 showed  multiple pulmonary cysts in the lower lobes not definitively visible  on this plain film series. There was some as a continuation suggestive  of small airway or small vessel disease.    CHRIS MAGDALENO MD         SYSTEM ID:  [id number]       Echocardiogram (5/4/2025):  Summary    1. Normal left ventricular chamber size; normal ejection fraction (68% by 2-D biplane); normal wall motion. There is septal flattening present consistent with RV pressure/volume overload.     2. Severely enlarged right ventricular chamber size with moderately reduced systolic function. Estimated PA systolic pressure is 63mmHg, which may be underestimated due to degree of TR.     3. Moderate-severe tricuspid valve regurgitation.     4. Trivial pericardial effusion.     5. Enlarged inferior vena cava size with reduced inspiratory collapse (<50%) consistent with elevated central venous pressures.     6. Compared to the prior study from 8/21/24, the current study reveals the IVC is now dilated. No other substantial changes noted.      RHC (September 2024):  Her RA pressure was 10 mmHg, RV was 79 over 15 mmHg, PA was 80/23 with a mean of 44 mmHg, pulmonary capillary wedge pressure of 9, aortic saturation 96%, PA saturation of 58.8, thermodilution cardiac  output of 3.03 L/min, index of 2.3 L/min/m , and PVR of 11.5 Wood units.  She had acute vasodilator testing with inhaled nitric oxide.  With inhaled nitric oxide her PA pressure dropped to 66/18 with a mean of 37, PA saturation was 79%, aortic saturation was 100%, thermodilution cardiac output of 3.3 with an index of 2.5, pulmonary capital wedge pressure of 12 mmHg and 7.7 Wood units.     PFT (September 2024)  Her pulmonary function test showed an FVC of 87% predicted, FEV1 of 84% predicted, FEV1 by FVC of 74% predicted, TLC of 72% predicted, and DLCO of 16% predicted.     V/Q scan (October 2024)  Low probability for chronic thromboembolic disease           Medications   Current Facility-Administered Medications   Medication Dose Route Frequency Provider Last Rate Last Admin    Patient is already receiving anticoagulation with heparin, enoxaparin (LOVENOX), warfarin (COUMADIN)  or other anticoagulant medication   Does not apply Continuous PRN Mauro Merida MD         Current Facility-Administered Medications   Medication Dose Route Frequency Provider Last Rate Last Admin    amLODIPine (NORVASC) tablet 2.5 mg  2.5 mg Oral Daily Mauro Merida MD   2.5 mg at 05/31/25 0743    apixaban ANTICOAGULANT (ELIQUIS) tablet 5 mg  5 mg Oral BID Mauro Merida MD   5 mg at 05/31/25 0743    aspirin (ASA) chewable tablet 81 mg  81 mg Oral Daily Mauro Merida MD   81 mg at 05/31/25 0742    gabapentin (NEURONTIN) capsule 100 mg  100 mg Oral At Bedtime Mauro Merida MD   100 mg at 05/30/25 2137    magnesium oxide (MAG-OX) tablet 400 mg  400 mg Oral Daily Mauro Merida MD   400 mg at 05/31/25 0743    multivitamin, therapeutic (THERA-VIT) tablet 1 tablet  1 tablet Oral Daily Mauro Merida MD   1 tablet at 05/31/25 0743    potassium chloride ER (MICRO-K) CR capsule 20 mEq  20 mEq Oral Daily Mauro Merida MD   20 mEq at 05/31/25 0742    sildenafil (REVATIO) tablet 20 mg  20 mg Oral TID Mauro Merida MD   20 mg at  05/31/25 0742    sodium chloride (PF) 0.9% PF flush 3 mL  3 mL Intracatheter Q8H [facility] Mauro Merida MD   3 mL at 05/30/25 2134    traZODone (DESYREL) tablet 100 mg  100 mg Oral At Bedtime Mauro Merida MD   100 mg at 05/30/25 2139

## 2025-06-01 ENCOUNTER — APPOINTMENT (OUTPATIENT)
Dept: GENERAL RADIOLOGY | Facility: CLINIC | Age: 85
End: 2025-06-01
Payer: COMMERCIAL

## 2025-06-01 LAB
ALBUMIN SERPL BCG-MCNC: 3.3 G/DL (ref 3.5–5.2)
ALP SERPL-CCNC: 75 U/L (ref 40–150)
ALT SERPL W P-5'-P-CCNC: <5 U/L (ref 0–50)
ANION GAP SERPL CALCULATED.3IONS-SCNC: 12 MMOL/L (ref 7–15)
ANION GAP SERPL CALCULATED.3IONS-SCNC: 9 MMOL/L (ref 7–15)
AST SERPL W P-5'-P-CCNC: 16 U/L (ref 0–45)
ATRIAL RATE - MUSE: 91 BPM
BILIRUB SERPL-MCNC: 1.1 MG/DL
BUN SERPL-MCNC: 14.3 MG/DL (ref 8–23)
BUN SERPL-MCNC: 14.5 MG/DL (ref 8–23)
CALCIUM SERPL-MCNC: 8.1 MG/DL (ref 8.8–10.4)
CALCIUM SERPL-MCNC: 8.4 MG/DL (ref 8.8–10.4)
CHLORIDE SERPL-SCNC: 97 MMOL/L (ref 98–107)
CHLORIDE SERPL-SCNC: 98 MMOL/L (ref 98–107)
CREAT SERPL-MCNC: 1 MG/DL (ref 0.51–0.95)
CREAT SERPL-MCNC: 1.05 MG/DL (ref 0.51–0.95)
DIASTOLIC BLOOD PRESSURE - MUSE: NORMAL MMHG
EGFRCR SERPLBLD CKD-EPI 2021: 52 ML/MIN/1.73M2
EGFRCR SERPLBLD CKD-EPI 2021: 55 ML/MIN/1.73M2
ERYTHROCYTE [DISTWIDTH] IN BLOOD BY AUTOMATED COUNT: 18.6 % (ref 10–15)
GLUCOSE SERPL-MCNC: 109 MG/DL (ref 70–99)
GLUCOSE SERPL-MCNC: 93 MG/DL (ref 70–99)
HCO3 SERPL-SCNC: 27 MMOL/L (ref 22–29)
HCO3 SERPL-SCNC: 30 MMOL/L (ref 22–29)
HCT VFR BLD AUTO: 42 % (ref 35–47)
HGB BLD-MCNC: 13.3 G/DL (ref 11.7–15.7)
INTERPRETATION ECG - MUSE: NORMAL
MAGNESIUM SERPL-MCNC: 2 MG/DL (ref 1.7–2.3)
MAGNESIUM SERPL-MCNC: 2.1 MG/DL (ref 1.7–2.3)
MCH RBC QN AUTO: 31.2 PG (ref 26.5–33)
MCHC RBC AUTO-ENTMCNC: 31.7 G/DL (ref 31.5–36.5)
MCV RBC AUTO: 99 FL (ref 78–100)
P AXIS - MUSE: 47 DEGREES
PLATELET # BLD AUTO: 177 10E3/UL (ref 150–450)
POTASSIUM SERPL-SCNC: 3.4 MMOL/L (ref 3.4–5.3)
POTASSIUM SERPL-SCNC: 4.2 MMOL/L (ref 3.4–5.3)
PR INTERVAL - MUSE: 196 MS
PROT SERPL-MCNC: 6.2 G/DL (ref 6.4–8.3)
QRS DURATION - MUSE: 76 MS
QT - MUSE: 396 MS
QTC - MUSE: 487 MS
R AXIS - MUSE: 123 DEGREES
RBC # BLD AUTO: 4.26 10E6/UL (ref 3.8–5.2)
SODIUM SERPL-SCNC: 136 MMOL/L (ref 135–145)
SODIUM SERPL-SCNC: 137 MMOL/L (ref 135–145)
SYSTOLIC BLOOD PRESSURE - MUSE: NORMAL MMHG
T AXIS - MUSE: -22 DEGREES
VENTRICULAR RATE- MUSE: 91 BPM
WBC # BLD AUTO: 5.3 10E3/UL (ref 4–11)

## 2025-06-01 PROCEDURE — 80053 COMPREHEN METABOLIC PANEL: CPT

## 2025-06-01 PROCEDURE — 82310 ASSAY OF CALCIUM: CPT

## 2025-06-01 PROCEDURE — 250N000013 HC RX MED GY IP 250 OP 250 PS 637

## 2025-06-01 PROCEDURE — 85014 HEMATOCRIT: CPT

## 2025-06-01 PROCEDURE — 83735 ASSAY OF MAGNESIUM: CPT

## 2025-06-01 PROCEDURE — 71045 X-RAY EXAM CHEST 1 VIEW: CPT

## 2025-06-01 PROCEDURE — 82374 ASSAY BLOOD CARBON DIOXIDE: CPT

## 2025-06-01 PROCEDURE — 120N000002 HC R&B MED SURG/OB UMMC

## 2025-06-01 PROCEDURE — 71045 X-RAY EXAM CHEST 1 VIEW: CPT | Mod: 26 | Performed by: STUDENT IN AN ORGANIZED HEALTH CARE EDUCATION/TRAINING PROGRAM

## 2025-06-01 PROCEDURE — 85041 AUTOMATED RBC COUNT: CPT

## 2025-06-01 PROCEDURE — 99231 SBSQ HOSP IP/OBS SF/LOW 25: CPT | Mod: GC | Performed by: INTERNAL MEDICINE

## 2025-06-01 PROCEDURE — 36415 COLL VENOUS BLD VENIPUNCTURE: CPT

## 2025-06-01 PROCEDURE — 250N000011 HC RX IP 250 OP 636

## 2025-06-01 RX ORDER — POTASSIUM CHLORIDE 750 MG/1
40 TABLET, EXTENDED RELEASE ORAL ONCE
Status: COMPLETED | OUTPATIENT
Start: 2025-06-01 | End: 2025-06-01

## 2025-06-01 RX ORDER — TORSEMIDE 20 MG/1
40 TABLET ORAL 2 TIMES DAILY
Status: DISCONTINUED | OUTPATIENT
Start: 2025-06-01 | End: 2025-06-02 | Stop reason: HOSPADM

## 2025-06-01 RX ORDER — FUROSEMIDE 10 MG/ML
80 INJECTION INTRAMUSCULAR; INTRAVENOUS ONCE
Status: COMPLETED | OUTPATIENT
Start: 2025-06-01 | End: 2025-06-01

## 2025-06-01 RX ADMIN — APIXABAN 5 MG: 5 TABLET, FILM COATED ORAL at 20:34

## 2025-06-01 RX ADMIN — MAGNESIUM OXIDE TAB 400 MG (241.3 MG ELEMENTAL MG) 400 MG: 400 (241.3 MG) TAB at 08:19

## 2025-06-01 RX ADMIN — APIXABAN 5 MG: 5 TABLET, FILM COATED ORAL at 08:19

## 2025-06-01 RX ADMIN — THERA TABS 1 TABLET: TAB at 08:19

## 2025-06-01 RX ADMIN — ASPIRIN 81 MG CHEWABLE TABLET 81 MG: 81 TABLET CHEWABLE at 08:19

## 2025-06-01 RX ADMIN — FUROSEMIDE 80 MG: 10 INJECTION, SOLUTION INTRAMUSCULAR; INTRAVENOUS at 01:16

## 2025-06-01 RX ADMIN — AMLODIPINE BESYLATE 2.5 MG: 2.5 TABLET ORAL at 08:19

## 2025-06-01 RX ADMIN — TRAZODONE HYDROCHLORIDE 100 MG: 100 TABLET ORAL at 21:47

## 2025-06-01 RX ADMIN — SILDENAFIL 20 MG: 20 TABLET ORAL at 08:19

## 2025-06-01 RX ADMIN — SILDENAFIL 20 MG: 20 TABLET ORAL at 16:00

## 2025-06-01 RX ADMIN — GABAPENTIN 100 MG: 100 CAPSULE ORAL at 21:48

## 2025-06-01 RX ADMIN — SILDENAFIL 20 MG: 20 TABLET ORAL at 20:34

## 2025-06-01 RX ADMIN — TORSEMIDE 40 MG: 20 TABLET ORAL at 16:00

## 2025-06-01 RX ADMIN — POTASSIUM CHLORIDE 40 MEQ: 750 TABLET, EXTENDED RELEASE ORAL at 08:19

## 2025-06-01 RX ADMIN — POTASSIUM CHLORIDE 20 MEQ: 750 CAPSULE, EXTENDED RELEASE ORAL at 08:19

## 2025-06-01 ASSESSMENT — ACTIVITIES OF DAILY LIVING (ADL)
ADLS_ACUITY_SCORE: 41

## 2025-06-01 NOTE — PROGRESS NOTES
[facility]    Cardiology Progress Note- Cardiology    I personally saw and examined this patient, reviewed imaging and laboratory studies, confirmed physical examination and discussed results and plan with patient and  family.         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 who comes into hospital w/ weight gain and decompensated heart failure         Changes today 06/01/2025  - Net negative 440 ml over 24 hours with lasix IV 80 mg X2  - Feeling much better  - Transition to Torsemide 40 mg PO BID today  - Orthostatics BID  - Daily standing weights  - Recheck evening chemistry panel   - CXR for RLL crackles, was laying on that side, read consistent with possible edema but continue to favor switch to orals today as overall feeling much better and net negative I/O       #ADHF  #COPD associated precapillary pulmonary HTN on PRN home O2  #RV failure  #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 RHC 9/2024: RA 10, PA 80/23 (44), wedge (9),  thermodilution CO 3.03, CI 2.3, PVR 11.5. Coming in w/ 20lb weight gain and volume overload. BNP 5196 on admission, higher than prior. On RA on admission, volume overloaded on admission exam. Inadequate response to uptitration of oral diuretics outpt. Overall, concerned she had ADHF 2/2 RV failure due to pulmonary HTN. Now feeling much better 06/01 after IV diuresis, switching to oral 06/01 to evaluate whether adequate maintenance of weight and volume status through AM 06/02  - Cont PTA sildenafil 20mg TID  - Resume PTA torsemide 40mg BID 06/01, had good response to lasix 80 mg IV BID  - Echo pending  - I/Os, daily weights  - Cont PTA amlodipine  - Weight: Unclear EDW, but per pt 90lb. Weight on admission 104lb, 98 lbs 05/31, requesting  repeat weight 06/01         #FABIOLA  B/l Cr ~0.6 (also frail, may not be fully representative). Cr on admission 1.21, suspected cardiorenal  - Diuresis as above, Cr improving with diuresis  - Trend        #Recent PE RLL 5/3/25  - Cont PTA apixaban        #Mild hyperbilirubinemia   Noted as a chronic issue. Congestive hepatopathy may also play a role  - Trend        #Insomnia  - Cont PTA trazodone        #Peripheral neuropathy  - Cont PTA gabapentin     Diet: Fluid restriction 2000 ML FLUID  Combination Diet Low Saturated Fat Na <2400mg Diet, No Caffeine Diet    DVT Prophylaxis: DOAC  Bonner Catheter: Not present  Cardiac Monitoring: ACTIVE order. Indication: Acute decompensated heart failure (48 hours)  Code Status: Full Code          Clinically Significant Risk Factors        # Hypokalemia: Lowest K = 3.2 mmol/L in last 2 days, will replace as needed   # Hypochloremia: Lowest Cl = 97 mmol/L in last 2 days, will monitor as appropriate  # Hypocalcemia: Lowest Ca = 8.1 mg/dL in last 2 days, will monitor and replace as appropriate     # Hypoalbuminemia: Lowest albumin = 3.3 g/dL at 6/1/2025  6:21 AM, will monitor as appropriate      # Acute heart failure with preserved ejection fraction: heart failure noted on problem list, last echo with EF >50%, and receiving IV diuretics                      Social Drivers of Health   Housing Stability: Unknown (5/4/2025)    Received from [employer] Stability Vital Sign     Unable to Pay for Housing in the Last Year: No     Homeless in the Last Year: No   Tobacco Use: Unknown (5/30/2025)    Patient History     Smoking Tobacco Use: Never     Smokeless Tobacco Use: Unknown         Disposition Plan     Medically Ready for Discharge: Anticipated in 2-4 Days        Entered: Wayne Mackenzie MD 06/01/2025, 7:28 AM   The patient's care was discussed with the Attending Physician, Dr. Blake.      Wayne Mackenzie MD  PGY-3, Internal Medicine   [facility]  [facility]  ______________________________________________________________________    Interval History     Overnight/Subjective: no acute events overnight, did get another 80 mg of IV lasix overnight, feels much better this morning and interested in going home but discussed risks and benefits and why we feel she should stay, was amenable after discussion     Physical Exam   Vital Signs: Temp: 97.6  F (36.4  C) Temp src: Axillary BP: 106/65 Pulse: 92   Resp: 17 SpO2: 94 % O2 Device: Nasal cannula Oxygen Delivery: 3 LPM  Weight: 98 lbs 11.2 oz    GEN: alert, comfortable, NAD  HEENT: EOMI,  anicteric sclera  CV: RRR, normal S1 S2, systolic murmur present  RESP: On RA, diminished BS in bases  ABDOMEN:  soft, nontender, nondistended, +BS  MSK: WWP. 1+ pitting edema in b/l LE  NEURO: Alert and oriented, moving all limbs spontaneously, mentation intact and speech normal  PSYCH: Appropriate mood and affect to match    Medical Decision Making         Data     I have personally reviewed the following data over the past 24 hrs:    5.3  \   13.3   / 177     136 97 (L) 14.3 /  93   3.4 30 (H) 1.05 (H) \     ALT: <5 AST: 16 AP: 75 TBILI: 1.1   ALB: 3.3 (L) TOT PROTEIN: 6.2 (L) LIPASE: N/A       Imaging results reviewed over the past 24 hrs:   Recent Results (from the past 24 hours)   Echo Complete   Result Value    LVEF  60-65%    Narrative    [id number]  [id number]  [id number]  [id number]^LIAVN     [facility],[place]  Echocardiography Laboratory  [address] [place] [id number]     Name: FAREED PAEZ  MRN: [id number]  : 1940  Study Date: 2025 10:38 AM  Age: 85 yrs  Gender: Female  Patient Location: [facility]  Reason For Study: Heart Failure  Ordering Physician: LIVAN MORTON  Performed By: Monae Austin     BSA: 1.4 m2  Height: 59 in  Weight: 98 lb  HR: 103  ______________________________________________________________________________  Procedure  Echocardiogram  with two-dimensional, color and spectral Doppler.  ______________________________________________________________________________  Interpretation Summary  Severe pulmonary hypertension is present. Pulmonary artery systolic pressure  is at least 71 mmHg (56 mmHg+RA pressure 15 mmHg). PA pressure may be  underestimated due to significant TR.  Severe right ventricular dilation is present. Global right ventricular  function is severely reduced. RVFAC 19%, RVFWSL -10.5%, ER9fsFH -10.8%.  Flattened septum is consistent with right ventricular pressure overload.  Global and regional left ventricular function is normal with an EF of 60-65%.  Moderate to severe tricuspid insufficiency is present.  This study was compared with the study from 11/12/2012: Pulmonary  hypertension, TR, and RV dysfunction are new compared with the prior stress  study from 11/12/2012. Findings are similar to those described in the outside  report from [facility] dated 5/4/25.  ______________________________________________________________________________  Left Ventricle  Global and regional left ventricular function is normal with an EF of 60-65%.  Left ventricular size is normal. Left ventricular diastolic function is  indeterminate. Flattened septum is consistent with right ventricular pressure  overload.     Right Ventricle  Severe right ventricular dilation is present. Global right ventricular  function is severely reduced. RVFAC 19%, RVFWSL -10.%, [id number] -10.8%.     Atria  The left atrium appears normal. Severe right atrial enlargement is present.     Mitral Valve  Trace mitral insufficiency is present.     Aortic Valve  Mild aortic valve calcification is present. Mild aortic insufficiency is  present.     Tricuspid Valve  Moderate to severe tricuspid insufficiency is present. Severe pulmonary  hypertension is present. Pulmonary artery systolic pressure is at least 71  mmHg (56 mmHg+RA pressure 15 mmHg).     Pulmonic  Valve  Mild pulmonic insufficiency is present.     Vessels  The aorta root is normal. Dilation of the inferior vena cava is present with  abnormal respiratory variation in diameter. IVC diameter >2.1 cm collapsing  <50% with sniff suggests a high RA pressure estimated at 15 mmHg or greater.     Pericardium  No pericardial effusion is present.     Compared to Previous Study  This study was compared with the study from 2012 . Pulmonary  hypertension, TR, and RV dysfunction are new compared with the prior stress  study from 2012. Findings are similar to those described in the outside  report from [facility] dated 25.  ______________________________________________________________________________  MMode/2D Measurements & Calculations  IVSd: 0.64 cm  LVIDd: 3.3 cm  LVIDs: 1.7 cm  LVPWd: 0.67 cm  FS: 48.1 %  LV mass(C)d: 51.8 grams  LV mass(C)dI: 38.0 grams/m2  Ao root diam: 2.9 cm  asc Aorta Diam: 3.5 cm  LVOT diam: 1.8 cm  LVOT area: 2.5 cm2  Ao root diam index Ht(cm/m): 1.9  Ao root diam index BSA (cm/m2): 2.1  Asc Ao diam index BSA (cm/m2): 2.6  Asc Ao diam index Ht(cm/m): 2.4  LA Volume (BP): 19.9 ml     LA Volume Index (BP): 14.6 ml/m2  RWT: 0.41     Doppler Measurements & Calculations  MV E max cong: 63.7 cm/sec  MV A max cong: 107.4 cm/sec  MV E/A: 0.59  MV dec slope: 669.7 cm/sec2  MV dec time: 0.10 sec  PA acc time: 0.07 sec  TR max cong: 374.7 cm/sec  TR max P.2 mmHg  RV S Cong: 11.0 cm/sec     ______________________________________________________________________________  Report approved by: Clint Neri MD on 2025 12:58 PM

## 2025-06-02 ENCOUNTER — APPOINTMENT (OUTPATIENT)
Dept: PHYSICAL THERAPY | Facility: CLINIC | Age: 85
DRG: 292 | End: 2025-06-02
Attending: STUDENT IN AN ORGANIZED HEALTH CARE EDUCATION/TRAINING PROGRAM
Payer: COMMERCIAL

## 2025-06-02 VITALS
BODY MASS INDEX: 19.86 KG/M2 | OXYGEN SATURATION: 90 % | RESPIRATION RATE: 16 BRPM | HEIGHT: 59 IN | DIASTOLIC BLOOD PRESSURE: 63 MMHG | HEART RATE: 92 BPM | WEIGHT: 98.5 LBS | SYSTOLIC BLOOD PRESSURE: 107 MMHG | TEMPERATURE: 98 F

## 2025-06-02 LAB
ALBUMIN SERPL BCG-MCNC: 3.1 G/DL (ref 3.5–5.2)
ALP SERPL-CCNC: 72 U/L (ref 40–150)
ALT SERPL W P-5'-P-CCNC: <5 U/L (ref 0–50)
ANION GAP SERPL CALCULATED.3IONS-SCNC: 9 MMOL/L (ref 7–15)
AST SERPL W P-5'-P-CCNC: 16 U/L (ref 0–45)
BILIRUB SERPL-MCNC: 1.1 MG/DL
BUN SERPL-MCNC: 14.8 MG/DL (ref 8–23)
CALCIUM SERPL-MCNC: 7.9 MG/DL (ref 8.8–10.4)
CHLORIDE SERPL-SCNC: 99 MMOL/L (ref 98–107)
CREAT SERPL-MCNC: 0.97 MG/DL (ref 0.51–0.95)
EGFRCR SERPLBLD CKD-EPI 2021: 57 ML/MIN/1.73M2
ERYTHROCYTE [DISTWIDTH] IN BLOOD BY AUTOMATED COUNT: 18.3 % (ref 10–15)
GLUCOSE SERPL-MCNC: 91 MG/DL (ref 70–99)
HCO3 SERPL-SCNC: 28 MMOL/L (ref 22–29)
HCT VFR BLD AUTO: 39.7 % (ref 35–47)
HGB BLD-MCNC: 12.4 G/DL (ref 11.7–15.7)
MAGNESIUM SERPL-MCNC: 1.8 MG/DL (ref 1.7–2.3)
MCH RBC QN AUTO: 30.5 PG (ref 26.5–33)
MCHC RBC AUTO-ENTMCNC: 31.2 G/DL (ref 31.5–36.5)
MCV RBC AUTO: 98 FL (ref 78–100)
PLATELET # BLD AUTO: 172 10E3/UL (ref 150–450)
POTASSIUM SERPL-SCNC: 4.2 MMOL/L (ref 3.4–5.3)
PROT SERPL-MCNC: 5.9 G/DL (ref 6.4–8.3)
RBC # BLD AUTO: 4.07 10E6/UL (ref 3.8–5.2)
SODIUM SERPL-SCNC: 136 MMOL/L (ref 135–145)
WBC # BLD AUTO: 5 10E3/UL (ref 4–11)

## 2025-06-02 PROCEDURE — 250N000013 HC RX MED GY IP 250 OP 250 PS 637

## 2025-06-02 PROCEDURE — 85041 AUTOMATED RBC COUNT: CPT

## 2025-06-02 PROCEDURE — 97530 THERAPEUTIC ACTIVITIES: CPT | Mod: GP

## 2025-06-02 PROCEDURE — 97161 PT EVAL LOW COMPLEX 20 MIN: CPT | Mod: GP

## 2025-06-02 PROCEDURE — 83735 ASSAY OF MAGNESIUM: CPT

## 2025-06-02 PROCEDURE — 99239 HOSP IP/OBS DSCHRG MGMT >30: CPT | Mod: GC | Performed by: INTERNAL MEDICINE

## 2025-06-02 PROCEDURE — 97116 GAIT TRAINING THERAPY: CPT | Mod: GP

## 2025-06-02 PROCEDURE — 84075 ASSAY ALKALINE PHOSPHATASE: CPT

## 2025-06-02 PROCEDURE — 36415 COLL VENOUS BLD VENIPUNCTURE: CPT

## 2025-06-02 PROCEDURE — 84132 ASSAY OF SERUM POTASSIUM: CPT

## 2025-06-02 PROCEDURE — 85018 HEMOGLOBIN: CPT

## 2025-06-02 RX ORDER — CALCIUM CARBONATE 500 MG/1
2 TABLET, CHEWABLE ORAL 3 TIMES DAILY PRN
Qty: 120 TABLET | Refills: 2 | Status: SHIPPED | OUTPATIENT
Start: 2025-06-02 | End: 2026-06-02

## 2025-06-02 RX ADMIN — THERA TABS 1 TABLET: TAB at 08:35

## 2025-06-02 RX ADMIN — ASPIRIN 81 MG CHEWABLE TABLET 81 MG: 81 TABLET CHEWABLE at 08:35

## 2025-06-02 RX ADMIN — TORSEMIDE 40 MG: 20 TABLET ORAL at 08:35

## 2025-06-02 RX ADMIN — POTASSIUM CHLORIDE 20 MEQ: 750 CAPSULE, EXTENDED RELEASE ORAL at 08:36

## 2025-06-02 RX ADMIN — AMLODIPINE BESYLATE 2.5 MG: 2.5 TABLET ORAL at 08:36

## 2025-06-02 RX ADMIN — SILDENAFIL 20 MG: 20 TABLET ORAL at 15:31

## 2025-06-02 RX ADMIN — SILDENAFIL 20 MG: 20 TABLET ORAL at 08:35

## 2025-06-02 RX ADMIN — MAGNESIUM OXIDE TAB 400 MG (241.3 MG ELEMENTAL MG) 400 MG: 400 (241.3 MG) TAB at 08:35

## 2025-06-02 RX ADMIN — APIXABAN 5 MG: 5 TABLET, FILM COATED ORAL at 08:35

## 2025-06-02 ASSESSMENT — ACTIVITIES OF DAILY LIVING (ADL)
ADLS_ACUITY_SCORE: 46
ADLS_ACUITY_SCORE: 41
ADLS_ACUITY_SCORE: 41
ADLS_ACUITY_SCORE: 46
ADLS_ACUITY_SCORE: 44
ADLS_ACUITY_SCORE: 46
ADLS_ACUITY_SCORE: 41
ADLS_ACUITY_SCORE: 46
ADLS_ACUITY_SCORE: 46
ADLS_ACUITY_SCORE: 41
ADLS_ACUITY_SCORE: 46
ADLS_ACUITY_SCORE: 46

## 2025-06-02 NOTE — PROGRESS NOTES
ED PT Evaluation:      06/02/25 1400   Appointment Info   Signing Clinician's Name / Credentials (PT) Chris Gill, PT, DPT   Living Environment   People in Home alone   Current Living Arrangements house   Home Accessibility no concerns   Transportation Anticipated family or friend will provide   Living Environment Comments Lives alone in a house, no stairs.   Self-Care   Equipment Currently Used at Home walker, rolling   Fall history within last six months no   Activity/Exercise/Self-Care Comment IND/mod I with mobility and ADLs at baseline. Intermittently uses 3WW. Uses 4L O2 at home prn. Has several children/grandchildren who live nearby and can assist prn. Plans to move to an ILF/YOU when a unit is ready.   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   Cognition   Affect/Mental Status (Cognition) WFL   Pain Assessment   Patient Currently in Pain No   Posture    Posture Forward head position   Range of Motion (ROM)   Range of Motion ROM is WFL   Strength (Manual Muscle Testing)   Strength (Manual Muscle Testing) Deficits observed during functional mobility   Strength Comments Grossly deconditioned   Bed Mobility   Bed Mobility supine-sit   Supine-Sit [place] (Bed Mobility) modified independence   Assistive Device (Bed Mobility) bed rails   Transfers   Transfers sit-stand transfer   Sit-Stand Transfer   Sit-Stand [place] (Transfers) modified  independence   Assistive Device (Sit-Stand Transfers) walker, front-wheeled   Gait/Stairs (Locomotion)   Ellis Level (Gait) supervision   Assistive Device (Gait) walker, front-wheeled   Balance   Balance no deficits were identified   Clinical Impression   Criteria for Skilled Therapeutic Intervention Yes, treatment indicated   PT Diagnosis (PT) Impaired functional mobility   Influenced by the following impairments deconditioning   Functional limitations due to impairments gait, activity tolerance, self cares   Clinical Presentation (PT Evaluation Complexity) stable   Clinical Presentation Rationale clinical judgement   Clinical Decision Making (Complexity) low complexity   Planned Therapy Interventions (PT) gait training;home exercise program;patient/family education;strengthening;wheelchair management/propulsion training;risk factor education;home program guidelines   Risk & Benefits of therapy have been explained evaluation/treatment results reviewed;care plan/treatment goals reviewed;risks/benefits reviewed;current/potential barriers reviewed;participants voiced agreement with care plan;participants included;patient   PT Total Evaluation Time   PT Eval, Low Complexity Minutes (03737) 6   Physical Therapy Goals   PT Frequency One time eval and treatment only   PT Predicted Duration/Target Date for Goal Attainment 06/02/25   PT Goals Gait   PT: Gait Modified independent;Assistive device;Standard walker;Rolling walker;150 feet;Goal Met   PT Discharge Planning   PT Plan dc   PT Discharge Recommendation (DC Rec) home with assist;home with home care physical therapy   PT Rationale for DC Rec Patient appears to be at/nearing functional baseline. Anticipate will be safe to return home with assist from family and HH PT until eventual transition to ILF/YOU.   PT Brief overview of current status SBA to manage O2, gait with FWW   PT Total Distance Amb During Session (feet) 200   Physical Therapy Time and Intention    Total Session Time (sum of timed and untimed services) 6

## 2025-06-02 NOTE — PLAN OF CARE
Physical Therapy Discharge Summary    Reason for therapy discharge:    All goals and outcomes met, no further needs identified.    Progress towards therapy goal(s). See goals on Care Plan in [facility] electronic health record for goal details.  Goals met    Therapy recommendation(s):    Continued therapy is recommended.  Rationale/Recommendations:  [facility] PT.

## 2025-06-04 ENCOUNTER — TELEPHONE (OUTPATIENT)
Dept: CARDIOLOGY | Facility: CLINIC | Age: 85
End: 2025-06-04
Payer: COMMERCIAL

## 2025-06-04 DIAGNOSIS — R60.9 EDEMA: Primary | ICD-10-CM

## 2025-06-04 DIAGNOSIS — R06.09 DOE (DYSPNEA ON EXERTION): ICD-10-CM

## 2025-06-04 DIAGNOSIS — I27.20 PULMONARY HTN (H): Primary | ICD-10-CM

## 2025-06-04 NOTE — TELEPHONE ENCOUNTER
[facility]: Cardiology Nurse Care Coordination   Post Discharge Phone Call Note    Christina Novoa's most recent inpatient dates and diagnoses:   Admission Date: 5/30/2025   Admission Diagnosis: Acute on chronic right-sided congestive heart failure (H) - I50.813  Heart failure (H) - I50.9   Discharge Date: 6/2/2025   Discharge Diagnosis: Acute on chronic right-sided congestive heart failure (H) - I50.813  Nausea - R11.0  Gastroesophageal reflux disease with esophagitis without hemorrhage - K21.00  Pulmonary hypertension (H) - I27.20     Per hospital discharge summary and inpatient provider notes:   PCP in 7 days for follow-up   Labs in 1-2 weeks     Post Discharge Assessment:  Patient weight today was 96.2lb. educated to weigh daily. Her dry weight is estimated around 98lb. Advised to call with weight gain of 2lb in 1 day or 5lb in 7 days. Patient does have a scale at home. She is knowledgeable to home discharge med regimen and has no questions. She does not have a regular PCP and would like to establish within [facility] system. Order placed. Recommended labs at [facility] walk in lab in 1-2 weeks, orders placed. Follow-up schedule on 6/17 with Deb Mehta    Medication Review:  I have reviewed all Christina's medications per the discharge summary with particular attention to any medication changes, including discontinued and new medications.     Follow-Up Plan:  Recommended labs at [facility] walk in lab in 1-2 weeks, orders placed. Follow-up schedule on 6/17 with Deb Mehta        I reviewed the Cardiology clinic's phone number ([phone] Option #1 and after hours on-call number [phone] #4 and ask for the On-Call Cardiologist) and have advised Christina to contact us with changes or worsening of symptoms, additional questions about medications and appt scheduling needs.   Christina verbalizes understanding and agrees with plan of care.

## 2025-06-11 ENCOUNTER — TELEPHONE (OUTPATIENT)
Dept: CARDIOLOGY | Facility: CLINIC | Age: 85
End: 2025-06-11
Payer: COMMERCIAL

## 2025-06-11 ENCOUNTER — LAB (OUTPATIENT)
Dept: LAB | Facility: CLINIC | Age: 85
End: 2025-06-11
Payer: COMMERCIAL

## 2025-06-11 DIAGNOSIS — I27.20 PULMONARY HTN (H): ICD-10-CM

## 2025-06-11 DIAGNOSIS — R06.09 DOE (DYSPNEA ON EXERTION): ICD-10-CM

## 2025-06-11 LAB
ANION GAP SERPL CALCULATED.3IONS-SCNC: 12 MMOL/L (ref 7–15)
BUN SERPL-MCNC: 26 MG/DL (ref 8–23)
CALCIUM SERPL-MCNC: 8.8 MG/DL (ref 8.8–10.4)
CHLORIDE SERPL-SCNC: 98 MMOL/L (ref 98–107)
CREAT SERPL-MCNC: 1.29 MG/DL (ref 0.51–0.95)
EGFRCR SERPLBLD CKD-EPI 2021: 40 ML/MIN/1.73M2
GLUCOSE SERPL-MCNC: 124 MG/DL (ref 70–99)
HCO3 SERPL-SCNC: 27 MMOL/L (ref 22–29)
NT-PROBNP SERPL-MCNC: 4618 PG/ML (ref 0–624)
POTASSIUM SERPL-SCNC: 3.8 MMOL/L (ref 3.4–5.3)
SODIUM SERPL-SCNC: 137 MMOL/L (ref 135–145)

## 2025-06-11 PROCEDURE — 80048 BASIC METABOLIC PNL TOTAL CA: CPT

## 2025-06-11 PROCEDURE — 83880 ASSAY OF NATRIURETIC PEPTIDE: CPT

## 2025-06-11 PROCEDURE — 36415 COLL VENOUS BLD VENIPUNCTURE: CPT

## 2025-06-11 NOTE — TELEPHONE ENCOUNTER
Returned pt call to explain her labs that should be drawn today and she can go to any [facility] Location.  She will be going to [facility] today and have BNP and BMP drawn for Dr Beaulieu.    Ward Moe RN on 6/11/2025 at 12:27 PM

## 2025-06-12 ENCOUNTER — RESULTS FOLLOW-UP (OUTPATIENT)
Dept: CARDIOLOGY | Facility: CLINIC | Age: 85
End: 2025-06-12

## 2025-06-16 NOTE — PROGRESS NOTES
CARDIOLOGY PH CLINIC TELEPHONE VISIT    Date of telephone visit: 06/17/25    Christina Novoa is a 85 year old female who is being evaluated via a billable virtual visit.        Self reported vitals:  Weight: 97 lb   BP not reported      MEDICATIONS:  Current Outpatient Medications   Medication Sig Dispense Refill    amLODIPine (NORVASC) 2.5 MG tablet Take 1 tablet (2.5 mg) by mouth daily. 90 tablet 1    apixaban ANTICOAGULANT (ELIQUIS) 5 MG tablet Take 5 mg by mouth 2 times daily.      aspirin (ASA) 81 MG chewable tablet Take 81 mg by mouth daily.      calcium carbonate (TUMS) 500 MG chewable tablet Take 2 tablets (1,000 mg) by mouth 3 times daily as needed for heartburn. 120 tablet 2    gabapentin (NEURONTIN) 100 MG capsule Take 100 mg by mouth at bedtime.      magnesium oxide (MAG-OX) 400 MG tablet Take 1 tablet by mouth daily.      multivitamin, therapeutic (THERA-VIT) TABS Take 1 tablet by mouth daily      potassium chloride ER (K-TAB) 20 MEQ CR tablet Take 1 tablet (20 mEq) by mouth daily at 2 pm. (Patient taking differently: Take 20 mEq by mouth daily.) 90 tablet 1    sildenafil (REVATIO) 20 MG tablet Take 1 tablet (20 mg) by mouth 3 times daily. 90 tablet 9    torsemide (DEMADEX) 20 MG tablet Take 2 tablets (40 mg) by mouth 2 times daily. 360 tablet 3    traZODone (DESYREL) 50 MG tablet Take 100-150 mg by mouth at bedtime.         Primary PH cardiologist: Dr. Beaulieu      HPI:  Ms. 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 In early May, she was hospitalized at [facility] for right heart failure and she required IV diuresis. When she met last with Dr. Beaulieu in late May, she continued to have lower extremity edema and abdominal fullness with more SOB. She was still felt to be volume overloaded and was referred back for  "admission.  She was admitted from 5/30-6/2 and again given IV diuresis. Discharge weight was recorded at 98# and she was discharged on torsemide 40mg BID.     Today, I'm meeting with Christina bernard to follow up. She tells me she is pleased with how she is feeling. She has kept the swelling off, and home weight has leveled off at 97#. She has more \"pep\" and says her breathing is improved. Her abdomen distention is also improved and she has noticed a better appetite. She denies any new chest pain, palpitations, or dizziness/presyncope.     Labs done last week in preparation for our visit today were reviewed as below.      CURRENT PULMONARY HYPERTENSION REGIMEN:    PAH Rx: sildenafil 20mg TID    Diuretics: torsemide 40mg BID    Oxygen: 3-4LNC (using PRN)    Anticoagulation: apixaban  Indication: PE    Immunosuppression: None      ASSESSMENT/PLAN:      1. Pulmonary hypertension:   --Ms. Novoa has severe PAH in the setting of COPD. She was offered a cautious trial of sildenafil and seems to be doing ok on this. Most recent echo from her stay in May shows continued severe RV dilation with severely reduced RV function and moderate to severe TR. Clinically, however, she sounds to be much improved after her recent hospitalization. Dr. Beaulieu did not feel that her decompensation was due to the PDE5 inhibitors.    --As today is a virtual visit I cannot fully assess the patient's volume status. She reports her edema has essentially resolved. Home weight has been stable around 97#. As her SCr has gone up a bit on recent labs, I will cautiously reduce her torsemide slightly to 40mg AM/20mg PM. I asked her to watch closely for recurrence of edema and call with any concerns.   --Continue oxygen as is for now. Can reassess her oxygen needs when she returns next to clinic. I asked her to continue to monitor at home as well.       Follow up plan: Return to see Dr. Beaulieu in [place] in 6-8 weeks with repeat 6MWT and labs. I " asked the patient to call sooner with any new concerns.       Testing/labs:      Most recent labs:      Latest Reference Range & Units 06/02/25 06:36 06/11/25 14:01   Sodium 135 - 145 mmol/L 136 137   Potassium 3.4 - 5.3 mmol/L 4.2 3.8   Chloride 98 - 107 mmol/L 99 98   Carbon Dioxide (CO2) 22 - 29 mmol/L 28 27   Urea Nitrogen 8.0 - 23.0 mg/dL 14.8 26.0 (H)   Creatinine 0.51 - 0.95 mg/dL 0.97 (H) 1.29 (H)   GFR Estimate >60 mL/min/1.73m2 57 (L) 40 (L)   Calcium 8.8 - 10.4 mg/dL 7.9 (L) 8.8   Anion Gap 7 - 15 mmol/L 9 12   Magnesium 1.7 - 2.3 mg/dL 1.8    Albumin 3.5 - 5.2 g/dL 3.1 (L)    Protein Total 6.4 - 8.3 g/dL 5.9 (L)    Alkaline Phosphatase 40 - 150 U/L 72    ALT 0 - 50 U/L <5    AST 0 - 45 U/L 16    Bilirubin Total <=1.2 mg/dL 1.1    Glucose 70 - 99 mg/dL 91 124 (H)   N-Terminal Pro Bnp 0 - 624 pg/mL  4,618 (H)   WBC 4.0 - 11.0 10e3/uL 5.0    Hemoglobin 11.7 - 15.7 g/dL 12.4    Hematocrit 35.0 - 47.0 % 39.7    Platelet Count 150 - 450 10e3/uL 172    RBC Count 3.80 - 5.20 10e6/uL 4.07    MCV 78 - 100 fL 98    MCH 26.5 - 33.0 pg 30.5    MCHC 31.5 - 36.5 g/dL 31.2 (L)    RDW 10.0 - 15.0 % 18.3 (H)    (H): Data is abnormally high  (L): Data is abnormally low      Other recent pertinent testing:    Echo 5/31/25  Interpretation Summary  Severe pulmonary hypertension is present. Pulmonary artery systolic pressure  is at least 71 mmHg (56 mmHg+RA pressure 15 mmHg). PA pressure may be  underestimated due to significant TR.  Severe right ventricular dilation is present. Global right ventricular  function is severely reduced. RVFAC 19%, RVFWSL -10.5%, [id number] -10.8%.  Flattened septum is consistent with right ventricular pressure overload.  Global and regional left ventricular function is normal with an EF of 60-65%.  Moderate to severe tricuspid insufficiency is present.  This study was compared with the study from 11/12/2012: Pulmonary  hypertension, TR, and RV dysfunction are new compared with the prior  stress  study from 11/12/2012. Findings are similar to those described in the outside  report from [facility] dated 5/4/25.    [facility] (Outside-September 2024):  Her RA pressure was 10 mmHg, RV was 79 over 15 mmHg, PA was 80/23 with a mean of 44 mmHg, pulmonary capillary wedge pressure of 9, aortic saturation 96%, PA saturation of 58.8, thermodilution cardiac output of 3.03 L/min, index of 2.3 L/min/m , and PVR of 11.5 Wood units.  She had acute vasodilator testing with inhaled nitric oxide.  With inhaled nitric oxide her PA pressure dropped to 66/18 with a mean of 37, PA saturation was 79%, aortic saturation was 100%, thermodilution cardiac output of 3.3 with an index of 2.5, pulmonary capital wedge pressure of 12 mmHg and 7.7 Wood units.      NYHA Functional Class:  3      I have reviewed the note as documented above.  This accurately captures the substance of my conversation with the patient.    The longitudinal plan of care for the diagnosis(es)/condition(s) as documented were addressed during this visit. Due to the added complexity in care, I will continue to support Christina in the subsequent management and with ongoing continuity of care.      Phone call contact time  Call Started at 1441  Call Ended at 1451    I spent a total of 30 minutes on the date of encounter of which 10 minutes was spent in direct telephone medical discussion.        Reason for audio visit only:  The patient did not have access to video, while the distant provider did.         Deb Mehta PA-C  [facility] Cardiology--Pulmonary Hypertension Clinic

## 2025-06-17 ENCOUNTER — VIRTUAL VISIT (OUTPATIENT)
Dept: CARDIOLOGY | Facility: CLINIC | Age: 85
End: 2025-06-17
Attending: PHYSICIAN ASSISTANT
Payer: COMMERCIAL

## 2025-06-17 VITALS — WEIGHT: 97.3 LBS | BODY MASS INDEX: 19.61 KG/M2 | HEIGHT: 59 IN

## 2025-06-17 DIAGNOSIS — I27.20 PULMONARY HYPERTENSION (H): ICD-10-CM

## 2025-06-17 DIAGNOSIS — R06.09 DOE (DYSPNEA ON EXERTION): ICD-10-CM

## 2025-06-17 DIAGNOSIS — R06.02 SOB (SHORTNESS OF BREATH): ICD-10-CM

## 2025-06-17 DIAGNOSIS — I27.20 PULMONARY HTN (H): ICD-10-CM

## 2025-06-17 PROCEDURE — G2211 COMPLEX E/M VISIT ADD ON: HCPCS | Mod: 93 | Performed by: PHYSICIAN ASSISTANT

## 2025-06-17 PROCEDURE — 98014 SYNCH AUDIO-ONLY EST MOD 30: CPT | Performed by: PHYSICIAN ASSISTANT

## 2025-06-17 PROCEDURE — 1126F AMNT PAIN NOTED NONE PRSNT: CPT | Mod: 93 | Performed by: PHYSICIAN ASSISTANT

## 2025-06-17 RX ORDER — TORSEMIDE 20 MG/1
TABLET ORAL
COMMUNITY
Start: 2025-06-17

## 2025-06-17 ASSESSMENT — PAIN SCALES - GENERAL: PAINLEVEL_OUTOF10: NO PAIN (0)

## 2025-06-17 NOTE — LETTER
6/17/2025      RE: Christina Novoa  [address] [facility] [address]       Dear Colleague,    Thank you for the opportunity to participate in the care of your patient, Christina Novoa, at the [facility] [place] at [facility]. Please see a copy of my visit note below.        CARDIOLOGY PH CLINIC TELEPHONE VISIT    Date of telephone visit: 06/17/25    Christina Novoa is a 85 year old female who is being evaluated via a billable virtual visit.        Self reported vitals:  Weight: 97 lb   BP not reported      MEDICATIONS:  Current Outpatient Medications   Medication Sig Dispense Refill     amLODIPine (NORVASC) 2.5 MG tablet Take 1 tablet (2.5 mg) by mouth daily. 90 tablet 1     apixaban ANTICOAGULANT (ELIQUIS) 5 MG tablet Take 5 mg by mouth 2 times daily.       aspirin (ASA) 81 MG chewable tablet Take 81 mg by mouth daily.       calcium carbonate (TUMS) 500 MG chewable tablet Take 2 tablets (1,000 mg) by mouth 3 times daily as needed for heartburn. 120 tablet 2     gabapentin (NEURONTIN) 100 MG capsule Take 100 mg by mouth at bedtime.       magnesium oxide (MAG-OX) 400 MG tablet Take 1 tablet by mouth daily.       multivitamin, therapeutic (THERA-VIT) TABS Take 1 tablet by mouth daily       potassium chloride ER (K-TAB) 20 MEQ CR tablet Take 1 tablet (20 mEq) by mouth daily at 2 pm. (Patient taking differently: Take 20 mEq by mouth daily.) 90 tablet 1     sildenafil (REVATIO) 20 MG tablet Take 1 tablet (20 mg) by mouth 3 times daily. 90 tablet 9     torsemide (DEMADEX) 20 MG tablet Take 2 tablets (40 mg) by mouth 2 times daily. 360 tablet 3     traZODone (DESYREL) 50 MG tablet Take 100-150 mg by mouth at bedtime.         Primary PH cardiologist: Dr. Beaulieu      HPI:  Ms. 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 Follow up with your primary care provider in the next week.   "is maintained on monotherapy with sildenafil. In early May, she was hospitalized at [facility] for right heart failure and she required IV diuresis. When she met last with Dr. Beaulieu in late May, she continued to have lower extremity edema and abdominal fullness with more SOB. She was still felt to be volume overloaded and was referred back for admission.  She was admitted from 5/30-6/2 and again given IV diuresis. Discharge weight was recorded at 98# and she was discharged on torsemide 40mg BID.     Today, I'm meeting with Christina bernard to follow up. She tells me she is pleased with how she is feeling. She has kept the swelling off, and home weight has leveled off at 97#. She has more \"pep\" and says her breathing is improved. Her abdomen distention is also improved and she has noticed a better appetite. She denies any new chest pain, palpitations, or dizziness/presyncope.     Labs done last week in preparation for our visit today were reviewed as below.      CURRENT PULMONARY HYPERTENSION REGIMEN:    PAH Rx: sildenafil 20mg TID    Diuretics: torsemide 40mg BID    Oxygen: 3-4LNC (using PRN)    Anticoagulation: apixaban  Indication: PE    Immunosuppression: None      ASSESSMENT/PLAN:      1. Pulmonary hypertension:   --Ms. Novoa has severe PAH in the setting of COPD. She was offered a cautious trial of sildenafil and seems to be doing ok on this. Most recent echo from her stay in May shows continued severe RV dilation with severely reduced RV function and moderate to severe TR. Clinically, however, she sounds to be much improved after her recent hospitalization. Dr. Beaulieu did not feel that her decompensation was due to the PDE5 inhibitors.    --As today is a virtual visit I cannot fully assess the patient's volume status. She reports her edema has essentially resolved. Home weight has been stable around 97#. As her SCr has gone up a bit on recent labs, I will cautiously reduce her torsemide slightly to 40mg " AM/20mg PM. I asked her to watch closely for recurrence of edema and call with any concerns.   --Continue oxygen as is for now. Can reassess her oxygen needs when she returns next to clinic. I asked her to continue to monitor at home as well.       Follow up plan: Return to see Dr. Beaulieu in [place] in 6-8 weeks with repeat 6MWT and labs. I asked the patient to call sooner with any new concerns.       Testing/labs:      Most recent labs:      Latest Reference Range & Units 06/02/25 06:36 06/11/25 14:01   Sodium 135 - 145 mmol/L 136 137   Potassium 3.4 - 5.3 mmol/L 4.2 3.8   Chloride 98 - 107 mmol/L 99 98   Carbon Dioxide (CO2) 22 - 29 mmol/L 28 27   Urea Nitrogen 8.0 - 23.0 mg/dL 14.8 26.0 (H)   Creatinine 0.51 - 0.95 mg/dL 0.97 (H) 1.29 (H)   GFR Estimate >60 mL/min/1.73m2 57 (L) 40 (L)   Calcium 8.8 - 10.4 mg/dL 7.9 (L) 8.8   Anion Gap 7 - 15 mmol/L 9 12   Magnesium 1.7 - 2.3 mg/dL 1.8    Albumin 3.5 - 5.2 g/dL 3.1 (L)    Protein Total 6.4 - 8.3 g/dL 5.9 (L)    Alkaline Phosphatase 40 - 150 U/L 72    ALT 0 - 50 U/L <5    AST 0 - 45 U/L 16    Bilirubin Total <=1.2 mg/dL 1.1    Glucose 70 - 99 mg/dL 91 124 (H)   N-Terminal Pro Bnp 0 - 624 pg/mL  4,618 (H)   WBC 4.0 - 11.0 10e3/uL 5.0    Hemoglobin 11.7 - 15.7 g/dL 12.4    Hematocrit 35.0 - 47.0 % 39.7    Platelet Count 150 - 450 10e3/uL 172    RBC Count 3.80 - 5.20 10e6/uL 4.07    MCV 78 - 100 fL 98    MCH 26.5 - 33.0 pg 30.5    MCHC 31.5 - 36.5 g/dL 31.2 (L)    RDW 10.0 - 15.0 % 18.3 (H)    (H): Data is abnormally high  (L): Data is abnormally low      Other recent pertinent testing:    Echo 5/31/25  Interpretation Summary  Severe pulmonary hypertension is present. Pulmonary artery systolic pressure  is at least 71 mmHg (56 mmHg+RA pressure 15 mmHg). PA pressure may be  underestimated due to significant TR.  Severe right ventricular dilation is present. Global right ventricular  function is severely reduced. RVFAC 19%, RVFWSL -10.5%, EC3xvJC -10.8%.  Flattened  septum is consistent with right ventricular pressure overload.  Global and regional left ventricular function is normal with an EF of 60-65%.  Moderate to severe tricuspid insufficiency is present.  This study was compared with the study from 11/12/2012: Pulmonary  hypertension, TR, and RV dysfunction are new compared with the prior stress  study from 11/12/2012. Findings are similar to those described in the outside  report from [facility] dated 5/4/25.    [facility] (Outside-September 2024):  Her RA pressure was 10 mmHg, RV was 79 over 15 mmHg, PA was 80/23 with a mean of 44 mmHg, pulmonary capillary wedge pressure of 9, aortic saturation 96%, PA saturation of 58.8, thermodilution cardiac output of 3.03 L/min, index of 2.3 L/min/m , and PVR of 11.5 Wood units.  She had acute vasodilator testing with inhaled nitric oxide.  With inhaled nitric oxide her PA pressure dropped to 66/18 with a mean of 37, PA saturation was 79%, aortic saturation was 100%, thermodilution cardiac output of 3.3 with an index of 2.5, pulmonary capital wedge pressure of 12 mmHg and 7.7 Wood units.      NYHA Functional Class:  3      I have reviewed the note as documented above.  This accurately captures the substance of my conversation with the patient.    The longitudinal plan of care for the diagnosis(es)/condition(s) as documented were addressed during this visit. Due to the added complexity in care, I will continue to support Christina in the subsequent management and with ongoing continuity of care.      Phone call contact time  Call Started at 1441  Call Ended at 1451    I spent a total of 30 minutes on the date of encounter of which 10 minutes was spent in direct telephone medical discussion.        Reason for audio visit only:  The patient did not have access to video, while the distant provider did.         Deb Mehta PA-C  [facility] Cardiology--Pulmonary Hypertension Clinic     Virtual Visit Details    Type of service:   Telephone Visit     Please do not hesitate to contact me if you have any questions/concerns.     Sincerely,     ALPA Renner

## 2025-06-17 NOTE — NURSING NOTE
Current patient location: [address] [place] [id number]    Is the patient currently in the state of [place]? YES    Visit mode: TELEPHONE    If the visit is dropped, the patient can be reconnected by:VIDEO VISIT: Text to cell phone:   Telephone Information:   Mobile [phone]       Will anyone else be joining the visit? NO  (If patient encounters technical issues they should call [phone]    Are changes needed to the allergy or medication list? No    Patient denies any changes and states that all information remains accurate since last reviewed/verified.     Are refills needed on medications prescribed by this physician? NO    Rooming Documentation:  Not applicable    Pt states vitals have been normal, does not recall exact readings     Reason for visit: DONATO Ornelas MA VVF

## 2025-06-17 NOTE — PATIENT INSTRUCTIONS
You were seen today in the Pulmonary Hypertension Clinic at the [facility].     Cardiology Provider you saw during your visit:    ALPA Renner    Medication Changes:  REDUCE your torsemide to 40mg in the morning and 20mg in the afternoon.    Follow-up:   Follow-up with Dr Beaulieu in 2 months at the [facility] with labs and 6 minute walk test prior.    Please call us immediately if you have any syncope (fainting or passing out), chest pain, edema (swelling or weight gain), or decline in your functional status (general decline in how you are feeling).    If you have emergent concerns after hours or on the weekend, please call our on-call Cardiologist at [phone], option [phone]. For emergencies call [phone].     Thank you for allowing us to be a part of your care here at the [facility]    If you have questions or concerns please contact us at:    Ward Moe RN (P: [phone])    Nurse Coordinator       Pulmonary Hypertension     [facility]         [facility] [place]   (Prior Auths & Pt Assistance)   ()  Clinic   Clinic   Pulmonary Hypertension   Pulmonary Hypertension  [facility]  [facility]  (P)[phone]    (P) [phone]  (F) [phone]

## 2025-06-26 ENCOUNTER — TELEPHONE (OUTPATIENT)
Dept: CARDIOLOGY | Facility: CLINIC | Age: 85
End: 2025-06-26
Payer: COMMERCIAL

## 2025-06-26 NOTE — TELEPHONE ENCOUNTER
Called and LVM to schedule 6 min walk before she see's dr Beaulieu on 10/28. Gave her the number to schedule [phone]    Suri Beard  Clinic    Pulmonary Hypertension   [facility]  (P) [phone]

## 2025-07-09 ENCOUNTER — HOSPITAL ENCOUNTER (OUTPATIENT)
Dept: RESPIRATORY THERAPY | Facility: CLINIC | Age: 85
Discharge: HOME | End: 2025-07-09
Attending: PHYSICIAN ASSISTANT
Payer: COMMERCIAL

## 2025-07-09 DIAGNOSIS — I27.20 PULMONARY HYPERTENSION (H): ICD-10-CM

## 2025-07-09 DIAGNOSIS — R06.02 SOB (SHORTNESS OF BREATH): ICD-10-CM

## 2025-07-09 DIAGNOSIS — I27.20 PULMONARY HYPERTENSION (H): Primary | ICD-10-CM

## 2025-07-09 PROCEDURE — 94618 PULMONARY STRESS TESTING: CPT

## 2025-07-09 PROCEDURE — 999N000157 HC STATISTIC RCP TIME EA 10 MIN

## 2025-07-09 RX ORDER — TORSEMIDE 20 MG/1
TABLET ORAL
Qty: 300 TABLET | Refills: 3 | Status: SHIPPED | OUTPATIENT
Start: 2025-07-09

## 2025-07-09 NOTE — PROGRESS NOTES
Six Minute Walk Test:   Probe: (head probe) Stops: (0)   Patient walked (448 Ft /135.55 m) in 6 minutes. LLN = (1009.29 Ft /307.63 m)   O2 system: (02 tank.) (staff carrying)   Walking aides: walker  Pre-walk: SP02 (81%) Heart Rate (92) Barrett Dyspnea = (0) Fatigue = (0) BP (98/57)   On RA with Activity: Lowest SPO2 (83%) Highest HR: (95)   On 2LPM: Lowest SPO2 (84%) Highest HR: (100)   On 4LPM: Lowest SPO2 (90%) Highest HR: (105)   On 6LPM: Lowest SPO2 (89%) Highest HR: (109)   Post-walk: SP02 (91%) Heart Rate (93) Barrett Dyspnea = (0) Fatigue = (5)   Recovery time to baseline 02 SAT (2) minutes and HR (1) minutes.                             Patient reports walk distance may have been affected by (knee pain.)  BP after 1 minute Rest: 110/57    BP after 5 minutes Rest: 104/57               See  in epic for full report of 6-minute walk.      Pt required 2L NC at rest, and 6L NC with activity.

## 2025-07-14 ENCOUNTER — TELEPHONE (OUTPATIENT)
Dept: CARDIOLOGY | Facility: CLINIC | Age: 85
End: 2025-07-14
Payer: COMMERCIAL

## 2025-07-14 DIAGNOSIS — I27.20 PULMONARY HYPERTENSION (H): ICD-10-CM

## 2025-07-14 DIAGNOSIS — R06.02 SOB (SHORTNESS OF BREATH): ICD-10-CM

## 2025-07-14 RX ORDER — AMLODIPINE BESYLATE 2.5 MG/1
2.5 TABLET ORAL DAILY
Qty: 90 TABLET | Refills: 0 | Status: SHIPPED | OUTPATIENT
Start: 2025-07-14

## 2025-07-14 NOTE — TELEPHONE ENCOUNTER
Spoke with pt regarding her oxygen needs.  She said that she is on 4L with exertion, and told her that it's ok to increase to 6LPM as needed.  Pt feels like she's gaining some fluid around her belly and it's uncomfortable.  Pt verified she is on 40mg am and 20mg pm of torsemide.  Will confim with Deb Mehta about dosing.  Pt verbalized understanding and had no questions at the time of instruction.    Ward Moe RN on 7/14/2025 at 11:45 AM      Per 6MWT last week, she needs 4-6L with any exertion.  She doesn't see TT until October.   Please be sure she has the appropriate oxygen/orders in place.     Thanks!     Deb

## 2025-07-16 RX ORDER — TORSEMIDE 20 MG/1
40 TABLET ORAL 2 TIMES DAILY
Qty: 360 TABLET | Refills: 3 | Status: SHIPPED | OUTPATIENT
Start: 2025-07-16

## 2025-07-16 NOTE — TELEPHONE ENCOUNTER
Spoke with pt about Deb's recommendation.  Pt will plan to start the 40mg BID and report back in to verify if it's helping or not.    Ward Moe, RN on 7/16/2025 at 11:09 AM      Deb Mehta PA Thiery, Bryan, RN  Thanks. I think if she feels better on the higher dose, ok to go back up to 40mg BID if she'd like.    Thanks

## 2025-08-25 ENCOUNTER — VIRTUAL VISIT (OUTPATIENT)
Dept: CARDIOLOGY | Facility: CLINIC | Age: 85
End: 2025-08-25
Attending: PHYSICIAN ASSISTANT
Payer: COMMERCIAL

## 2025-08-25 VITALS
WEIGHT: 96.2 LBS | BODY MASS INDEX: 19.4 KG/M2 | SYSTOLIC BLOOD PRESSURE: 104 MMHG | HEIGHT: 59 IN | DIASTOLIC BLOOD PRESSURE: 69 MMHG

## 2025-08-25 DIAGNOSIS — R06.02 SOB (SHORTNESS OF BREATH): ICD-10-CM

## 2025-08-25 DIAGNOSIS — I27.20 PULMONARY HYPERTENSION (H): ICD-10-CM

## 2025-08-25 RX ORDER — TORSEMIDE 20 MG/1
40 TABLET ORAL 2 TIMES DAILY
Qty: 280 TABLET | Refills: 3 | Status: SHIPPED | OUTPATIENT
Start: 2025-08-25

## 2025-08-25 ASSESSMENT — PAIN SCALES - GENERAL: PAINLEVEL_OUTOF10: NO PAIN (0)

## 2025-09-02 DIAGNOSIS — I27.24 CTEPH (CHRONIC THROMBOEMBOLIC PULMONARY HYPERTENSION) (H): Primary | ICD-10-CM

## 2025-09-03 RX ORDER — APIXABAN 5 MG/1
TABLET, FILM COATED ORAL
Qty: 180 TABLET | Refills: 4 | Status: SHIPPED | OUTPATIENT
Start: 2025-09-03